# Patient Record
Sex: FEMALE | Race: ASIAN | NOT HISPANIC OR LATINO | Employment: OTHER | URBAN - METROPOLITAN AREA
[De-identification: names, ages, dates, MRNs, and addresses within clinical notes are randomized per-mention and may not be internally consistent; named-entity substitution may affect disease eponyms.]

---

## 2017-01-09 ENCOUNTER — PRENATAL OFFICE VISIT - HEALTHEAST (OUTPATIENT)
Dept: MIDWIFE SERVICES | Facility: CLINIC | Age: 22
End: 2017-01-09

## 2017-01-09 DIAGNOSIS — Z34.90 PREGNANCY: ICD-10-CM

## 2017-01-09 DIAGNOSIS — K59.00 CONSTIPATION: ICD-10-CM

## 2017-01-09 DIAGNOSIS — Z34.00 SUPERVISION OF NORMAL FIRST PREGNANCY: ICD-10-CM

## 2017-01-09 DIAGNOSIS — Z34.90 SUPERVISION OF NORMAL PREGNANCY: ICD-10-CM

## 2017-01-09 LAB — HIV 1+2 AB+HIV1 P24 AG SERPL QL IA: NEGATIVE

## 2017-01-09 ASSESSMENT — MIFFLIN-ST. JEOR: SCORE: 1279.06

## 2017-01-10 LAB
HBV SURFACE AG SERPL QL IA: NEGATIVE
SYPHILIS RPR SCREEN - HISTORICAL: NORMAL

## 2017-01-30 ENCOUNTER — PRENATAL OFFICE VISIT - HEALTHEAST (OUTPATIENT)
Dept: MIDWIFE SERVICES | Facility: CLINIC | Age: 22
End: 2017-01-30

## 2017-01-30 DIAGNOSIS — Z34.00 SUPERVISION OF NORMAL FIRST PREGNANCY: ICD-10-CM

## 2017-01-30 ASSESSMENT — MIFFLIN-ST. JEOR: SCORE: 1288.13

## 2017-01-31 LAB — SYPHILIS RPR SCREEN - HISTORICAL: NORMAL

## 2017-02-13 ENCOUNTER — PRENATAL OFFICE VISIT - HEALTHEAST (OUTPATIENT)
Dept: MIDWIFE SERVICES | Facility: CLINIC | Age: 22
End: 2017-02-13

## 2017-02-13 DIAGNOSIS — Z34.00 SUPERVISION OF NORMAL FIRST PREGNANCY: ICD-10-CM

## 2017-02-13 DIAGNOSIS — K59.00 CONSTIPATION: ICD-10-CM

## 2017-02-13 ASSESSMENT — MIFFLIN-ST. JEOR: SCORE: 1294.93

## 2017-02-27 ENCOUNTER — PRENATAL OFFICE VISIT - HEALTHEAST (OUTPATIENT)
Dept: MIDWIFE SERVICES | Facility: CLINIC | Age: 22
End: 2017-02-27

## 2017-02-27 DIAGNOSIS — K59.00 CONSTIPATION: ICD-10-CM

## 2017-02-27 DIAGNOSIS — Z34.90 PREGNANCY: ICD-10-CM

## 2017-02-27 ASSESSMENT — MIFFLIN-ST. JEOR: SCORE: 1305.14

## 2017-03-13 ENCOUNTER — PRENATAL OFFICE VISIT - HEALTHEAST (OUTPATIENT)
Dept: MIDWIFE SERVICES | Facility: CLINIC | Age: 22
End: 2017-03-13

## 2017-03-13 DIAGNOSIS — K59.00 CONSTIPATION: ICD-10-CM

## 2017-03-13 DIAGNOSIS — R12 HEARTBURN DURING PREGNANCY IN THIRD TRIMESTER: ICD-10-CM

## 2017-03-13 DIAGNOSIS — Z34.00 SUPERVISION OF NORMAL FIRST PREGNANCY: ICD-10-CM

## 2017-03-13 DIAGNOSIS — O26.893 HEARTBURN DURING PREGNANCY IN THIRD TRIMESTER: ICD-10-CM

## 2017-03-13 ASSESSMENT — MIFFLIN-ST. JEOR: SCORE: 1305.15

## 2017-03-27 ENCOUNTER — PRENATAL OFFICE VISIT - HEALTHEAST (OUTPATIENT)
Dept: MIDWIFE SERVICES | Facility: CLINIC | Age: 22
End: 2017-03-27

## 2017-03-27 DIAGNOSIS — Z34.00 SUPERVISION OF NORMAL FIRST PREGNANCY: ICD-10-CM

## 2017-03-27 ASSESSMENT — MIFFLIN-ST. JEOR: SCORE: 1317.62

## 2017-04-03 ENCOUNTER — PRENATAL OFFICE VISIT - HEALTHEAST (OUTPATIENT)
Dept: MIDWIFE SERVICES | Facility: CLINIC | Age: 22
End: 2017-04-03

## 2017-04-03 DIAGNOSIS — Z34.00 SUPERVISION OF NORMAL FIRST PREGNANCY: ICD-10-CM

## 2017-04-03 ASSESSMENT — MIFFLIN-ST. JEOR: SCORE: 1300.61

## 2017-04-10 ENCOUNTER — PRENATAL OFFICE VISIT - HEALTHEAST (OUTPATIENT)
Dept: MIDWIFE SERVICES | Facility: CLINIC | Age: 22
End: 2017-04-10

## 2017-04-10 DIAGNOSIS — Z34.00 SUPERVISION OF NORMAL FIRST PREGNANCY: ICD-10-CM

## 2017-04-10 DIAGNOSIS — K59.00 CONSTIPATION: ICD-10-CM

## 2017-04-10 ASSESSMENT — MIFFLIN-ST. JEOR: SCORE: 1315.35

## 2017-04-17 ENCOUNTER — PRENATAL OFFICE VISIT - HEALTHEAST (OUTPATIENT)
Dept: MIDWIFE SERVICES | Facility: CLINIC | Age: 22
End: 2017-04-17

## 2017-04-17 DIAGNOSIS — Z34.03 ENCOUNTER FOR SUPERVISION OF NORMAL FIRST PREGNANCY IN THIRD TRIMESTER: ICD-10-CM

## 2017-04-17 ASSESSMENT — MIFFLIN-ST. JEOR: SCORE: 1313.09

## 2017-04-24 ENCOUNTER — HOME CARE/HOSPICE - HEALTHEAST (OUTPATIENT)
Dept: HOME HEALTH SERVICES | Facility: HOME HEALTH | Age: 22
End: 2017-04-24

## 2017-04-26 ENCOUNTER — HOME CARE/HOSPICE - HEALTHEAST (OUTPATIENT)
Dept: HOME HEALTH SERVICES | Facility: HOME HEALTH | Age: 22
End: 2017-04-26

## 2017-04-27 ENCOUNTER — HOME CARE/HOSPICE - HEALTHEAST (OUTPATIENT)
Dept: HOME HEALTH SERVICES | Facility: HOME HEALTH | Age: 22
End: 2017-04-27

## 2017-06-05 ENCOUNTER — OFFICE VISIT - HEALTHEAST (OUTPATIENT)
Dept: MIDWIFE SERVICES | Facility: CLINIC | Age: 22
End: 2017-06-05

## 2017-06-05 DIAGNOSIS — Z00.00 HEALTH CARE MAINTENANCE: ICD-10-CM

## 2017-06-05 ASSESSMENT — MIFFLIN-ST. JEOR: SCORE: 1250.71

## 2017-07-03 ENCOUNTER — AMBULATORY - HEALTHEAST (OUTPATIENT)
Dept: FAMILY MEDICINE | Facility: CLINIC | Age: 22
End: 2017-07-03

## 2017-07-06 ENCOUNTER — OFFICE VISIT - HEALTHEAST (OUTPATIENT)
Dept: FAMILY MEDICINE | Facility: CLINIC | Age: 22
End: 2017-07-06

## 2017-07-06 DIAGNOSIS — Z23 IMMUNIZATION DUE: ICD-10-CM

## 2017-07-06 DIAGNOSIS — N39.0 UTI (URINARY TRACT INFECTION): ICD-10-CM

## 2017-07-07 ENCOUNTER — COMMUNICATION - HEALTHEAST (OUTPATIENT)
Dept: FAMILY MEDICINE | Facility: CLINIC | Age: 22
End: 2017-07-07

## 2017-07-25 ENCOUNTER — OFFICE VISIT - HEALTHEAST (OUTPATIENT)
Dept: FAMILY MEDICINE | Facility: CLINIC | Age: 22
End: 2017-07-25

## 2017-07-25 DIAGNOSIS — Z02.89 HISTORY AND PHYSICAL EXAMINATION, IMMIGRATION: ICD-10-CM

## 2017-07-25 ASSESSMENT — MIFFLIN-ST. JEOR: SCORE: 1163.96

## 2018-04-16 ENCOUNTER — OFFICE VISIT - HEALTHEAST (OUTPATIENT)
Dept: FAMILY MEDICINE | Facility: CLINIC | Age: 23
End: 2018-04-16

## 2018-04-16 DIAGNOSIS — O21.9 NAUSEA/VOMITING IN PREGNANCY: ICD-10-CM

## 2018-04-16 DIAGNOSIS — N91.2 AMENORRHEA: ICD-10-CM

## 2018-04-16 DIAGNOSIS — Z3A.09 9 WEEKS GESTATION OF PREGNANCY: ICD-10-CM

## 2018-04-16 LAB — HCG UR QL: POSITIVE

## 2018-04-16 ASSESSMENT — MIFFLIN-ST. JEOR: SCORE: 1145.82

## 2018-04-26 ENCOUNTER — AMBULATORY - HEALTHEAST (OUTPATIENT)
Dept: EMERGENCY MEDICINE | Facility: HOSPITAL | Age: 23
End: 2018-04-26

## 2018-05-03 ENCOUNTER — PRENATAL OFFICE VISIT - HEALTHEAST (OUTPATIENT)
Dept: FAMILY MEDICINE | Facility: CLINIC | Age: 23
End: 2018-05-03

## 2018-05-03 DIAGNOSIS — Z77.22 SECONDHAND SMOKE EXPOSURE: ICD-10-CM

## 2018-05-03 DIAGNOSIS — Z3A.11 11 WEEKS GESTATION OF PREGNANCY: ICD-10-CM

## 2018-05-03 LAB
ALBUMIN UR-MCNC: NEGATIVE MG/DL
APPEARANCE UR: ABNORMAL
BASOPHILS # BLD AUTO: 0 THOU/UL (ref 0–0.2)
BASOPHILS NFR BLD AUTO: 0 % (ref 0–2)
BILIRUB UR QL STRIP: NEGATIVE
COLOR UR AUTO: YELLOW
EOSINOPHIL # BLD AUTO: 0.1 THOU/UL (ref 0–0.4)
EOSINOPHIL NFR BLD AUTO: 1 % (ref 0–6)
ERYTHROCYTE [DISTWIDTH] IN BLOOD BY AUTOMATED COUNT: 12.9 % (ref 11–14.5)
GLUCOSE UR STRIP-MCNC: NEGATIVE MG/DL
HBV SURFACE AG SERPL QL IA: NEGATIVE
HCT VFR BLD AUTO: 39 % (ref 35–47)
HGB BLD-MCNC: 13.2 G/DL (ref 12–16)
HGB UR QL STRIP: NEGATIVE
HIV 1+2 AB+HIV1 P24 AG SERPL QL IA: NEGATIVE
KETONES UR STRIP-MCNC: NEGATIVE MG/DL
LEUKOCYTE ESTERASE UR QL STRIP: NEGATIVE
LYMPHOCYTES # BLD AUTO: 1 THOU/UL (ref 0.8–4.4)
LYMPHOCYTES NFR BLD AUTO: 15 % (ref 20–40)
MCH RBC QN AUTO: 28.6 PG (ref 27–34)
MCHC RBC AUTO-ENTMCNC: 33.8 G/DL (ref 32–36)
MCV RBC AUTO: 84 FL (ref 80–100)
MONOCYTES # BLD AUTO: 0.3 THOU/UL (ref 0–0.9)
MONOCYTES NFR BLD AUTO: 4 % (ref 2–10)
NEUTROPHILS # BLD AUTO: 5.5 THOU/UL (ref 2–7.7)
NEUTROPHILS NFR BLD AUTO: 80 % (ref 50–70)
NITRATE UR QL: NEGATIVE
PH UR STRIP: 6 [PH] (ref 5–8)
PLATELET # BLD AUTO: 298 THOU/UL (ref 140–440)
PMV BLD AUTO: 9.3 FL (ref 8.5–12.5)
RBC # BLD AUTO: 4.62 MILL/UL (ref 3.8–5.4)
SP GR UR STRIP: 1.02 (ref 1–1.03)
T PALLIDUM AB SER QL: NEGATIVE
UROBILINOGEN UR STRIP-ACNC: ABNORMAL
WBC: 7 THOU/UL (ref 4–11)

## 2018-05-03 ASSESSMENT — MIFFLIN-ST. JEOR: SCORE: 1137.88

## 2018-05-04 LAB
ANTIBODY SCREEN: NEGATIVE
BACTERIA SPEC CULT: NO GROWTH
C TRACH DNA SPEC QL PROBE+SIG AMP: NEGATIVE
N GONORRHOEA DNA SPEC QL NAA+PROBE: NEGATIVE
RUBV IGG SERPL QL IA: POSITIVE

## 2018-05-15 ENCOUNTER — RECORDS - HEALTHEAST (OUTPATIENT)
Dept: ADMINISTRATIVE | Facility: OTHER | Age: 23
End: 2018-05-15

## 2018-06-04 ENCOUNTER — COMMUNICATION - HEALTHEAST (OUTPATIENT)
Dept: FAMILY MEDICINE | Facility: CLINIC | Age: 23
End: 2018-06-04

## 2018-06-12 ENCOUNTER — COMMUNICATION - HEALTHEAST (OUTPATIENT)
Dept: FAMILY MEDICINE | Facility: CLINIC | Age: 23
End: 2018-06-12

## 2018-06-19 ENCOUNTER — PRENATAL OFFICE VISIT - HEALTHEAST (OUTPATIENT)
Dept: FAMILY MEDICINE | Facility: CLINIC | Age: 23
End: 2018-06-19

## 2018-06-19 DIAGNOSIS — Z34.82 ENCOUNTER FOR SUPERVISION OF OTHER NORMAL PREGNANCY IN SECOND TRIMESTER: ICD-10-CM

## 2018-06-19 LAB
AMPHETAMINES UR QL SCN: NORMAL
BARBITURATES UR QL: NORMAL
BENZODIAZ UR QL: NORMAL
CANNABINOIDS UR QL SCN: NORMAL
COCAINE UR QL: NORMAL
CREAT UR-MCNC: 36.7 MG/DL
METHADONE UR QL SCN: NORMAL
OPIATES UR QL SCN: NORMAL
OXYCODONE UR QL: NORMAL
PCP UR QL SCN: NORMAL

## 2018-06-19 ASSESSMENT — MIFFLIN-ST. JEOR: SCORE: 1158.29

## 2018-07-18 ENCOUNTER — PRENATAL OFFICE VISIT - HEALTHEAST (OUTPATIENT)
Dept: FAMILY MEDICINE | Facility: CLINIC | Age: 23
End: 2018-07-18

## 2018-07-18 DIAGNOSIS — Z34.02 SUPERVISION OF NORMAL FIRST PREGNANCY IN SECOND TRIMESTER: ICD-10-CM

## 2018-07-18 ASSESSMENT — MIFFLIN-ST. JEOR: SCORE: 1187.78

## 2018-07-19 ENCOUNTER — RECORDS - HEALTHEAST (OUTPATIENT)
Dept: ADMINISTRATIVE | Facility: OTHER | Age: 23
End: 2018-07-19

## 2018-08-29 ENCOUNTER — RECORDS - HEALTHEAST (OUTPATIENT)
Dept: ADMINISTRATIVE | Facility: OTHER | Age: 23
End: 2018-08-29

## 2018-08-29 ENCOUNTER — PRENATAL OFFICE VISIT - HEALTHEAST (OUTPATIENT)
Dept: FAMILY MEDICINE | Facility: CLINIC | Age: 23
End: 2018-08-29

## 2018-08-29 DIAGNOSIS — Z23 NEED FOR INFLUENZA VACCINATION: ICD-10-CM

## 2018-08-29 DIAGNOSIS — Z34.82 ENCOUNTER FOR SUPERVISION OF OTHER NORMAL PREGNANCY IN SECOND TRIMESTER: ICD-10-CM

## 2018-08-29 DIAGNOSIS — Z34.90 PREGNANCY: ICD-10-CM

## 2018-08-29 LAB
FASTING STATUS PATIENT QL REPORTED: NO
GLUCOSE 1H P 50 G GLC PO SERPL-MCNC: 76 MG/DL (ref 70–139)
HGB BLD-MCNC: 13 G/DL (ref 12–16)

## 2018-08-29 ASSESSMENT — MIFFLIN-ST. JEOR: SCORE: 1227.47

## 2018-08-30 LAB — T PALLIDUM AB SER QL: NEGATIVE

## 2018-09-02 ENCOUNTER — COMMUNICATION - HEALTHEAST (OUTPATIENT)
Dept: FAMILY MEDICINE | Facility: CLINIC | Age: 23
End: 2018-09-02

## 2018-09-28 ENCOUNTER — PRENATAL OFFICE VISIT - HEALTHEAST (OUTPATIENT)
Dept: FAMILY MEDICINE | Facility: CLINIC | Age: 23
End: 2018-09-28

## 2018-09-28 DIAGNOSIS — Z30.09 GENERAL COUNSELING AND ADVICE FOR CONTRACEPTIVE MANAGEMENT: ICD-10-CM

## 2018-09-28 DIAGNOSIS — Z3A.31 31 WEEKS GESTATION OF PREGNANCY: ICD-10-CM

## 2018-09-28 ASSESSMENT — MIFFLIN-ST. JEOR: SCORE: 1252.41

## 2018-10-19 ENCOUNTER — PRENATAL OFFICE VISIT - HEALTHEAST (OUTPATIENT)
Dept: FAMILY MEDICINE | Facility: CLINIC | Age: 23
End: 2018-10-19

## 2018-10-19 DIAGNOSIS — Z3A.34 34 WEEKS GESTATION OF PREGNANCY: ICD-10-CM

## 2018-10-19 ASSESSMENT — MIFFLIN-ST. JEOR: SCORE: 1261.49

## 2018-11-08 ENCOUNTER — PRENATAL OFFICE VISIT - HEALTHEAST (OUTPATIENT)
Dept: FAMILY MEDICINE | Facility: CLINIC | Age: 23
End: 2018-11-08

## 2018-11-08 DIAGNOSIS — Z3A.37 37 WEEKS GESTATION OF PREGNANCY: ICD-10-CM

## 2018-11-08 ASSESSMENT — MIFFLIN-ST. JEOR: SCORE: 1279.63

## 2018-11-09 LAB
ALLERGIC TO PENICILLIN: NO
GP B STREP DNA SPEC QL NAA+PROBE: NEGATIVE

## 2018-11-16 ENCOUNTER — PRENATAL OFFICE VISIT - HEALTHEAST (OUTPATIENT)
Dept: FAMILY MEDICINE | Facility: CLINIC | Age: 23
End: 2018-11-16

## 2018-11-16 DIAGNOSIS — Z3A.38 38 WEEKS GESTATION OF PREGNANCY: ICD-10-CM

## 2018-11-16 ASSESSMENT — MIFFLIN-ST. JEOR: SCORE: 1273.94

## 2018-11-23 ENCOUNTER — HOME CARE/HOSPICE - HEALTHEAST (OUTPATIENT)
Dept: HOME HEALTH SERVICES | Facility: HOME HEALTH | Age: 23
End: 2018-11-23

## 2018-11-25 ENCOUNTER — HOME CARE/HOSPICE - HEALTHEAST (OUTPATIENT)
Dept: HOME HEALTH SERVICES | Facility: HOME HEALTH | Age: 23
End: 2018-11-25

## 2018-11-28 ENCOUNTER — AMBULATORY - HEALTHEAST (OUTPATIENT)
Dept: FAMILY MEDICINE | Facility: CLINIC | Age: 23
End: 2018-11-28

## 2019-01-10 ENCOUNTER — OFFICE VISIT - HEALTHEAST (OUTPATIENT)
Dept: FAMILY MEDICINE | Facility: CLINIC | Age: 24
End: 2019-01-10

## 2019-01-10 DIAGNOSIS — G89.29 CHRONIC BILATERAL LOW BACK PAIN WITHOUT SCIATICA: ICD-10-CM

## 2019-01-10 DIAGNOSIS — Z30.42 DEPO-PROVERA CONTRACEPTIVE STATUS: ICD-10-CM

## 2019-01-10 DIAGNOSIS — M54.50 CHRONIC BILATERAL LOW BACK PAIN WITHOUT SCIATICA: ICD-10-CM

## 2019-01-10 DIAGNOSIS — R21 RASH AND NONSPECIFIC SKIN ERUPTION: ICD-10-CM

## 2019-01-10 LAB
HCG UR QL: NEGATIVE
SP GR UR STRIP: 1.02 (ref 1–1.03)

## 2019-01-10 RX ORDER — DIAPER,BRIEF,INFANT-TODD,DISP
EACH MISCELLANEOUS
Qty: 30 G | Refills: 1 | Status: SHIPPED | OUTPATIENT
Start: 2019-01-10 | End: 2023-05-15

## 2019-01-10 ASSESSMENT — MIFFLIN-ST. JEOR: SCORE: 1184.94

## 2019-04-01 ENCOUNTER — AMBULATORY - HEALTHEAST (OUTPATIENT)
Dept: FAMILY MEDICINE | Facility: CLINIC | Age: 24
End: 2019-04-01

## 2019-04-01 DIAGNOSIS — Z30.013 ENCOUNTER FOR INITIAL PRESCRIPTION OF INJECTABLE CONTRACEPTIVE: ICD-10-CM

## 2019-04-03 ENCOUNTER — AMBULATORY - HEALTHEAST (OUTPATIENT)
Dept: NURSING | Facility: CLINIC | Age: 24
End: 2019-04-03

## 2019-06-04 ENCOUNTER — OFFICE VISIT - HEALTHEAST (OUTPATIENT)
Dept: FAMILY MEDICINE | Facility: CLINIC | Age: 24
End: 2019-06-04

## 2019-06-04 DIAGNOSIS — M79.602 PAIN IN BOTH UPPER EXTREMITIES: ICD-10-CM

## 2019-06-04 DIAGNOSIS — Z34.90 PREGNANCY: ICD-10-CM

## 2019-06-04 DIAGNOSIS — Z30.42 DEPO-PROVERA CONTRACEPTIVE STATUS: ICD-10-CM

## 2019-06-04 DIAGNOSIS — M25.649 MORNING JOINT STIFFNESS OF HAND, UNSPECIFIED LATERALITY: ICD-10-CM

## 2019-06-04 DIAGNOSIS — M79.601 PAIN IN BOTH UPPER EXTREMITIES: ICD-10-CM

## 2019-06-04 LAB
ALBUMIN SERPL-MCNC: 4.3 G/DL (ref 3.5–5)
ALP SERPL-CCNC: 60 U/L (ref 45–120)
ALT SERPL W P-5'-P-CCNC: 16 U/L (ref 0–45)
ANION GAP SERPL CALCULATED.3IONS-SCNC: 9 MMOL/L (ref 5–18)
AST SERPL W P-5'-P-CCNC: 13 U/L (ref 0–40)
BASOPHILS # BLD AUTO: 0 THOU/UL (ref 0–0.2)
BASOPHILS NFR BLD AUTO: 0 % (ref 0–2)
BILIRUB SERPL-MCNC: 0.5 MG/DL (ref 0–1)
BUN SERPL-MCNC: 14 MG/DL (ref 8–22)
CALCIUM SERPL-MCNC: 9.9 MG/DL (ref 8.5–10.5)
CHLORIDE BLD-SCNC: 107 MMOL/L (ref 98–107)
CO2 SERPL-SCNC: 23 MMOL/L (ref 22–31)
CREAT SERPL-MCNC: 0.64 MG/DL (ref 0.6–1.1)
EOSINOPHIL # BLD AUTO: 0.1 THOU/UL (ref 0–0.4)
EOSINOPHIL NFR BLD AUTO: 1 % (ref 0–6)
ERYTHROCYTE [DISTWIDTH] IN BLOOD BY AUTOMATED COUNT: 11.9 % (ref 11–14.5)
ERYTHROCYTE [SEDIMENTATION RATE] IN BLOOD BY WESTERGREN METHOD: 6 MM/HR (ref 0–20)
GFR SERPL CREATININE-BSD FRML MDRD: >60 ML/MIN/1.73M2
GLUCOSE BLD-MCNC: 83 MG/DL (ref 70–125)
HCT VFR BLD AUTO: 40.5 % (ref 35–47)
HGB BLD-MCNC: 13.6 G/DL (ref 12–16)
LYMPHOCYTES # BLD AUTO: 2 THOU/UL (ref 0.8–4.4)
LYMPHOCYTES NFR BLD AUTO: 37 % (ref 20–40)
MCH RBC QN AUTO: 28.9 PG (ref 27–34)
MCHC RBC AUTO-ENTMCNC: 33.6 G/DL (ref 32–36)
MCV RBC AUTO: 86 FL (ref 80–100)
MONOCYTES # BLD AUTO: 0.3 THOU/UL (ref 0–0.9)
MONOCYTES NFR BLD AUTO: 6 % (ref 2–10)
NEUTROPHILS # BLD AUTO: 3.1 THOU/UL (ref 2–7.7)
NEUTROPHILS NFR BLD AUTO: 56 % (ref 50–70)
PLATELET # BLD AUTO: 273 THOU/UL (ref 140–440)
PMV BLD AUTO: 6.5 FL (ref 7–10)
POTASSIUM BLD-SCNC: 4 MMOL/L (ref 3.5–5)
PROT SERPL-MCNC: 7.7 G/DL (ref 6–8)
RBC # BLD AUTO: 4.71 MILL/UL (ref 3.8–5.4)
RHEUMATOID FACT SERPL-ACNC: <15 IU/ML (ref 0–30)
SODIUM SERPL-SCNC: 139 MMOL/L (ref 136–145)
TSH SERPL DL<=0.005 MIU/L-ACNC: 0.69 UIU/ML (ref 0.3–5)
WBC: 5.5 THOU/UL (ref 4–11)

## 2019-06-04 ASSESSMENT — MIFFLIN-ST. JEOR: SCORE: 1182.67

## 2019-06-05 LAB — 25(OH)D3 SERPL-MCNC: 19 NG/ML (ref 30–80)

## 2019-06-06 ENCOUNTER — AMBULATORY - HEALTHEAST (OUTPATIENT)
Dept: FAMILY MEDICINE | Facility: CLINIC | Age: 24
End: 2019-06-06

## 2019-06-06 DIAGNOSIS — E55.9 VITAMIN D DEFICIENCY: ICD-10-CM

## 2019-06-06 LAB — ANA SER QL: 0.8 U

## 2019-06-19 ENCOUNTER — AMBULATORY - HEALTHEAST (OUTPATIENT)
Dept: NURSING | Facility: CLINIC | Age: 24
End: 2019-06-19

## 2019-09-04 ENCOUNTER — AMBULATORY - HEALTHEAST (OUTPATIENT)
Dept: NURSING | Facility: CLINIC | Age: 24
End: 2019-09-04

## 2019-09-24 ENCOUNTER — RECORDS - HEALTHEAST (OUTPATIENT)
Dept: ADMINISTRATIVE | Facility: OTHER | Age: 24
End: 2019-09-24

## 2019-11-12 ENCOUNTER — COMMUNICATION - HEALTHEAST (OUTPATIENT)
Dept: FAMILY MEDICINE | Facility: CLINIC | Age: 24
End: 2019-11-12

## 2019-11-12 ENCOUNTER — AMBULATORY - HEALTHEAST (OUTPATIENT)
Dept: FAMILY MEDICINE | Facility: CLINIC | Age: 24
End: 2019-11-12

## 2019-11-12 DIAGNOSIS — Z30.42 DEPO-PROVERA CONTRACEPTIVE STATUS: ICD-10-CM

## 2020-09-08 ENCOUNTER — COMMUNICATION - HEALTHEAST (OUTPATIENT)
Dept: FAMILY MEDICINE | Facility: CLINIC | Age: 25
End: 2020-09-08

## 2020-09-08 ENCOUNTER — OFFICE VISIT - HEALTHEAST (OUTPATIENT)
Dept: FAMILY MEDICINE | Facility: CLINIC | Age: 25
End: 2020-09-08

## 2020-09-08 DIAGNOSIS — Z23 NEED FOR IMMUNIZATION AGAINST INFLUENZA: ICD-10-CM

## 2020-09-08 DIAGNOSIS — Z3A.12 12 WEEKS GESTATION OF PREGNANCY: ICD-10-CM

## 2020-09-08 DIAGNOSIS — N91.2 AMENORRHEA: ICD-10-CM

## 2020-09-08 DIAGNOSIS — Z32.01 PREGNANCY EXAMINATION OR TEST, POSITIVE RESULT: ICD-10-CM

## 2020-09-08 LAB — HCG UR QL: POSITIVE

## 2020-09-08 ASSESSMENT — MIFFLIN-ST. JEOR: SCORE: 1183.03

## 2020-09-22 ENCOUNTER — RECORDS - HEALTHEAST (OUTPATIENT)
Dept: ADMINISTRATIVE | Facility: OTHER | Age: 25
End: 2020-09-22

## 2020-10-09 ENCOUNTER — PRENATAL OFFICE VISIT - HEALTHEAST (OUTPATIENT)
Dept: FAMILY MEDICINE | Facility: CLINIC | Age: 25
End: 2020-10-09

## 2020-10-09 DIAGNOSIS — R29.898 TIGHTNESS OF NECK: ICD-10-CM

## 2020-10-09 DIAGNOSIS — Z34.81 ENCOUNTER FOR SUPERVISION OF OTHER NORMAL PREGNANCY IN FIRST TRIMESTER: ICD-10-CM

## 2020-10-09 DIAGNOSIS — S13.9XXA NECK SPRAIN, INITIAL ENCOUNTER: ICD-10-CM

## 2020-10-09 DIAGNOSIS — Z12.4 CERVICAL CANCER SCREENING: ICD-10-CM

## 2020-10-09 LAB
ALBUMIN UR-MCNC: NEGATIVE MG/DL
AMPHETAMINES UR QL SCN: NORMAL
APPEARANCE UR: ABNORMAL
BARBITURATES UR QL: NORMAL
BASOPHILS # BLD AUTO: 0 THOU/UL (ref 0–0.2)
BASOPHILS NFR BLD AUTO: 0 % (ref 0–2)
BENZODIAZ UR QL: NORMAL
BILIRUB UR QL STRIP: NEGATIVE
CANNABINOIDS UR QL SCN: NORMAL
COCAINE UR QL: NORMAL
COLOR UR AUTO: YELLOW
CREAT UR-MCNC: 92 MG/DL
EOSINOPHIL # BLD AUTO: 0.4 THOU/UL (ref 0–0.4)
EOSINOPHIL NFR BLD AUTO: 4 % (ref 0–6)
ERYTHROCYTE [DISTWIDTH] IN BLOOD BY AUTOMATED COUNT: 13.2 % (ref 11–14.5)
GLUCOSE UR STRIP-MCNC: NEGATIVE MG/DL
HCT VFR BLD AUTO: 33.7 % (ref 35–47)
HGB BLD-MCNC: 11.8 G/DL (ref 12–16)
HGB UR QL STRIP: ABNORMAL
HIV 1+2 AB+HIV1 P24 AG SERPL QL IA: NEGATIVE
IMM GRANULOCYTES # BLD: 0 THOU/UL
IMM GRANULOCYTES NFR BLD: 0 %
KETONES UR STRIP-MCNC: NEGATIVE MG/DL
LEUKOCYTE ESTERASE UR QL STRIP: NEGATIVE
LYMPHOCYTES # BLD AUTO: 2 THOU/UL (ref 0.8–4.4)
LYMPHOCYTES NFR BLD AUTO: 23 % (ref 20–40)
MCH RBC QN AUTO: 29.2 PG (ref 27–34)
MCHC RBC AUTO-ENTMCNC: 35 G/DL (ref 32–36)
MCV RBC AUTO: 83 FL (ref 80–100)
MONOCYTES # BLD AUTO: 0.4 THOU/UL (ref 0–0.9)
MONOCYTES NFR BLD AUTO: 4 % (ref 2–10)
NEUTROPHILS # BLD AUTO: 5.8 THOU/UL (ref 2–7.7)
NEUTROPHILS NFR BLD AUTO: 68 % (ref 50–70)
NITRATE UR QL: NEGATIVE
OPIATES UR QL SCN: NORMAL
OXYCODONE UR QL: NORMAL
PCP UR QL SCN: NORMAL
PH UR STRIP: 7 [PH] (ref 5–8)
PLATELET # BLD AUTO: 281 THOU/UL (ref 140–440)
PMV BLD AUTO: 9 FL (ref 8.5–12.5)
RBC # BLD AUTO: 4.04 MILL/UL (ref 3.8–5.4)
SP GR UR STRIP: 1.02 (ref 1–1.03)
UROBILINOGEN UR STRIP-ACNC: ABNORMAL
WBC: 8.6 THOU/UL (ref 4–11)

## 2020-10-09 RX ORDER — ACETAMINOPHEN 500 MG
1000 TABLET ORAL EVERY 6 HOURS PRN
Qty: 200 TABLET | Refills: 2 | Status: SHIPPED | OUTPATIENT
Start: 2020-10-09 | End: 2021-10-06

## 2020-10-09 ASSESSMENT — MIFFLIN-ST. JEOR: SCORE: 1186.07

## 2020-10-10 LAB
ANTIBODY SCREEN: NEGATIVE
BACTERIA SPEC CULT: NO GROWTH
HBV SURFACE AG SERPL QL IA: NEGATIVE
T PALLIDUM AB SER QL: NEGATIVE

## 2020-10-12 LAB
BKR LAB AP ABNORMAL BLEEDING: NO
BKR LAB AP BIRTH CONTROL/HORMONES: NORMAL
BKR LAB AP CERVICAL APPEARANCE: NORMAL
BKR LAB AP GYN ADEQUACY: NORMAL
BKR LAB AP GYN INTERPRETATION: NORMAL
BKR LAB AP HPV REFLEX: NORMAL
BKR LAB AP LMP: NORMAL
BKR LAB AP PATIENT STATUS: NORMAL
BKR LAB AP PREVIOUS ABNORMAL: NORMAL
BKR LAB AP PREVIOUS NORMAL: NORMAL
HIGH RISK?: NO
PATH REPORT.COMMENTS IMP SPEC: NORMAL
RESULT FLAG (HE HISTORICAL CONVERSION): NORMAL
RUBV IGG SERPL QL IA: NEGATIVE

## 2020-10-13 LAB
C TRACH DNA SPEC QL PROBE+SIG AMP: NEGATIVE
N GONORRHOEA DNA SPEC QL NAA+PROBE: NEGATIVE

## 2020-10-14 LAB
COLLECTION METHOD: NORMAL
LEAD BLD-MCNC: NORMAL UG/DL
LEAD BLDV-MCNC: <2 UG/DL

## 2020-10-15 ENCOUNTER — COMMUNICATION - HEALTHEAST (OUTPATIENT)
Dept: FAMILY MEDICINE | Facility: CLINIC | Age: 25
End: 2020-10-15

## 2020-11-09 ENCOUNTER — OFFICE VISIT - HEALTHEAST (OUTPATIENT)
Dept: FAMILY MEDICINE | Facility: CLINIC | Age: 25
End: 2020-11-09

## 2020-11-09 DIAGNOSIS — G89.29 CHRONIC BILATERAL LOW BACK PAIN WITHOUT SCIATICA: ICD-10-CM

## 2020-11-09 DIAGNOSIS — Z34.82 ENCOUNTER FOR SUPERVISION OF OTHER NORMAL PREGNANCY IN SECOND TRIMESTER: ICD-10-CM

## 2020-11-09 DIAGNOSIS — K59.00 CONSTIPATION, UNSPECIFIED CONSTIPATION TYPE: ICD-10-CM

## 2020-11-09 DIAGNOSIS — M54.50 CHRONIC BILATERAL LOW BACK PAIN WITHOUT SCIATICA: ICD-10-CM

## 2020-12-14 ENCOUNTER — PRENATAL OFFICE VISIT - HEALTHEAST (OUTPATIENT)
Dept: FAMILY MEDICINE | Facility: CLINIC | Age: 25
End: 2020-12-14

## 2020-12-14 ENCOUNTER — RECORDS - HEALTHEAST (OUTPATIENT)
Dept: ADMINISTRATIVE | Facility: OTHER | Age: 25
End: 2020-12-14

## 2020-12-14 DIAGNOSIS — Z34.82 ENCOUNTER FOR SUPERVISION OF OTHER NORMAL PREGNANCY IN SECOND TRIMESTER: ICD-10-CM

## 2020-12-14 ASSESSMENT — MIFFLIN-ST. JEOR: SCORE: 1232.57

## 2021-01-20 ENCOUNTER — RECORDS - HEALTHEAST (OUTPATIENT)
Dept: ADMINISTRATIVE | Facility: OTHER | Age: 26
End: 2021-01-20

## 2021-01-22 ENCOUNTER — PRENATAL OFFICE VISIT - HEALTHEAST (OUTPATIENT)
Dept: FAMILY MEDICINE | Facility: CLINIC | Age: 26
End: 2021-01-22

## 2021-01-22 DIAGNOSIS — Z34.82 ENCOUNTER FOR SUPERVISION OF OTHER NORMAL PREGNANCY IN SECOND TRIMESTER: ICD-10-CM

## 2021-01-22 LAB
FASTING STATUS PATIENT QL REPORTED: NO
GLUCOSE 1H P 50 G GLC PO SERPL-MCNC: 60 MG/DL (ref 70–139)
HGB BLD-MCNC: 11.8 G/DL (ref 12–16)

## 2021-01-22 ASSESSMENT — MIFFLIN-ST. JEOR: SCORE: 1273.39

## 2021-01-23 LAB — T PALLIDUM AB SER QL: NEGATIVE

## 2021-02-19 ENCOUNTER — PRENATAL OFFICE VISIT - HEALTHEAST (OUTPATIENT)
Dept: FAMILY MEDICINE | Facility: CLINIC | Age: 26
End: 2021-02-19

## 2021-02-19 DIAGNOSIS — K59.00 CONSTIPATION, UNSPECIFIED CONSTIPATION TYPE: ICD-10-CM

## 2021-02-19 DIAGNOSIS — Z34.83 ENCOUNTER FOR SUPERVISION OF OTHER NORMAL PREGNANCY IN THIRD TRIMESTER: ICD-10-CM

## 2021-02-19 DIAGNOSIS — O26.813 PREGNANCY RELATED FATIGUE IN THIRD TRIMESTER: ICD-10-CM

## 2021-02-19 LAB
HGB BLD-MCNC: 12 G/DL (ref 12–16)
TSH SERPL DL<=0.005 MIU/L-ACNC: 1.99 UIU/ML (ref 0.3–5)

## 2021-02-19 ASSESSMENT — MIFFLIN-ST. JEOR: SCORE: 1264.32

## 2021-02-22 ENCOUNTER — RECORDS - HEALTHEAST (OUTPATIENT)
Dept: ADMINISTRATIVE | Facility: OTHER | Age: 26
End: 2021-02-22

## 2021-02-22 ENCOUNTER — COMMUNICATION - HEALTHEAST (OUTPATIENT)
Dept: FAMILY MEDICINE | Facility: CLINIC | Age: 26
End: 2021-02-22

## 2021-02-22 DIAGNOSIS — R79.89 LOW VITAMIN D LEVEL: ICD-10-CM

## 2021-02-22 LAB — 25(OH)D3 SERPL-MCNC: 15.9 NG/ML (ref 30–80)

## 2021-03-05 ENCOUNTER — PRENATAL OFFICE VISIT - HEALTHEAST (OUTPATIENT)
Dept: FAMILY MEDICINE | Facility: CLINIC | Age: 26
End: 2021-03-05

## 2021-03-05 ENCOUNTER — RECORDS - HEALTHEAST (OUTPATIENT)
Dept: ADMINISTRATIVE | Facility: OTHER | Age: 26
End: 2021-03-05

## 2021-03-05 DIAGNOSIS — O36.5990 ASYMMETRIC IUGR AFFECTING PREGNANCY, ANTEPARTUM: ICD-10-CM

## 2021-03-05 DIAGNOSIS — F51.01 PRIMARY INSOMNIA: ICD-10-CM

## 2021-03-05 DIAGNOSIS — Z34.83 ENCOUNTER FOR SUPERVISION OF OTHER NORMAL PREGNANCY IN THIRD TRIMESTER: ICD-10-CM

## 2021-03-05 ASSESSMENT — MIFFLIN-ST. JEOR: SCORE: 1276.79

## 2021-03-06 LAB
ALLERGIC TO PENICILLIN: NO
GP B STREP DNA SPEC QL NAA+PROBE: NEGATIVE

## 2021-03-10 ENCOUNTER — COMMUNICATION - HEALTHEAST (OUTPATIENT)
Dept: FAMILY MEDICINE | Facility: CLINIC | Age: 26
End: 2021-03-10

## 2021-03-12 ENCOUNTER — PRENATAL OFFICE VISIT - HEALTHEAST (OUTPATIENT)
Dept: FAMILY MEDICINE | Facility: CLINIC | Age: 26
End: 2021-03-12

## 2021-03-12 DIAGNOSIS — Z34.83 ENCOUNTER FOR SUPERVISION OF OTHER NORMAL PREGNANCY IN THIRD TRIMESTER: ICD-10-CM

## 2021-03-12 ASSESSMENT — MIFFLIN-ST. JEOR: SCORE: 1273.39

## 2021-03-15 ENCOUNTER — RECORDS - HEALTHEAST (OUTPATIENT)
Dept: ADMINISTRATIVE | Facility: OTHER | Age: 26
End: 2021-03-15

## 2021-03-15 ENCOUNTER — PRENATAL OFFICE VISIT - HEALTHEAST (OUTPATIENT)
Dept: FAMILY MEDICINE | Facility: CLINIC | Age: 26
End: 2021-03-15

## 2021-03-15 ENCOUNTER — COMMUNICATION - HEALTHEAST (OUTPATIENT)
Dept: FAMILY MEDICINE | Facility: CLINIC | Age: 26
End: 2021-03-15

## 2021-03-15 DIAGNOSIS — O36.5930 POOR FETAL GROWTH AFFECTING MANAGEMENT OF MOTHER IN THIRD TRIMESTER, SINGLE OR UNSPECIFIED FETUS: ICD-10-CM

## 2021-03-15 ASSESSMENT — MIFFLIN-ST. JEOR: SCORE: 1275.66

## 2021-03-16 LAB
SARS-COV-2 PCR COMMENT: NORMAL
SARS-COV-2 RNA SPEC QL NAA+PROBE: NEGATIVE
SARS-COV-2 VIRUS SPECIMEN SOURCE: NORMAL

## 2021-03-17 ENCOUNTER — AMBULATORY - HEALTHEAST (OUTPATIENT)
Dept: MATERNAL FETAL MEDICINE | Facility: HOSPITAL | Age: 26
End: 2021-03-17

## 2021-03-17 ENCOUNTER — COMMUNICATION - HEALTHEAST (OUTPATIENT)
Dept: SCHEDULING | Facility: CLINIC | Age: 26
End: 2021-03-17

## 2021-03-17 ENCOUNTER — COMMUNICATION - HEALTHEAST (OUTPATIENT)
Dept: FAMILY MEDICINE | Facility: CLINIC | Age: 26
End: 2021-03-17

## 2021-03-17 DIAGNOSIS — O36.5930 POOR FETAL GROWTH AFFECTING MANAGEMENT OF MOTHER IN THIRD TRIMESTER, SINGLE OR UNSPECIFIED FETUS: ICD-10-CM

## 2021-03-17 DIAGNOSIS — O36.5990 POOR FETAL GROWTH AFFECTING MANAGEMENT OF MOTHER, ANTEPARTUM, SINGLE OR UNSPECIFIED FETUS: ICD-10-CM

## 2021-03-18 ENCOUNTER — RECORDS - HEALTHEAST (OUTPATIENT)
Dept: ULTRASOUND IMAGING | Facility: HOSPITAL | Age: 26
End: 2021-03-18

## 2021-03-18 ENCOUNTER — OFFICE VISIT - HEALTHEAST (OUTPATIENT)
Dept: MATERNAL FETAL MEDICINE | Facility: HOSPITAL | Age: 26
End: 2021-03-18

## 2021-03-18 ENCOUNTER — RECORDS - HEALTHEAST (OUTPATIENT)
Dept: ADMINISTRATIVE | Facility: OTHER | Age: 26
End: 2021-03-18

## 2021-03-18 DIAGNOSIS — O36.5930 POOR FETAL GROWTH AFFECTING MANAGEMENT OF MOTHER IN THIRD TRIMESTER, SINGLE OR UNSPECIFIED FETUS: ICD-10-CM

## 2021-03-18 DIAGNOSIS — O36.5990 MATERNAL CARE FOR OTHER KNOWN OR SUSPECTED POOR FETAL GROWTH, UNSPECIFIED TRIMESTER, NOT APPLICABLE OR UNSPECIFIED: ICD-10-CM

## 2021-03-19 ENCOUNTER — PRENATAL OFFICE VISIT - HEALTHEAST (OUTPATIENT)
Dept: FAMILY MEDICINE | Facility: CLINIC | Age: 26
End: 2021-03-19

## 2021-03-19 DIAGNOSIS — O36.5930 POOR FETAL GROWTH AFFECTING MANAGEMENT OF MOTHER IN THIRD TRIMESTER, SINGLE OR UNSPECIFIED FETUS: ICD-10-CM

## 2021-03-19 DIAGNOSIS — Z34.83 ENCOUNTER FOR SUPERVISION OF OTHER NORMAL PREGNANCY IN THIRD TRIMESTER: ICD-10-CM

## 2021-03-19 ASSESSMENT — MIFFLIN-ST. JEOR: SCORE: 1272.26

## 2021-03-24 ENCOUNTER — PRENATAL OFFICE VISIT - HEALTHEAST (OUTPATIENT)
Dept: FAMILY MEDICINE | Facility: CLINIC | Age: 26
End: 2021-03-24

## 2021-03-24 DIAGNOSIS — Z34.83 ENCOUNTER FOR SUPERVISION OF OTHER NORMAL PREGNANCY IN THIRD TRIMESTER: ICD-10-CM

## 2021-03-24 DIAGNOSIS — O36.5930 POOR FETAL GROWTH AFFECTING MANAGEMENT OF MOTHER IN THIRD TRIMESTER, SINGLE OR UNSPECIFIED FETUS: ICD-10-CM

## 2021-03-24 ASSESSMENT — MIFFLIN-ST. JEOR: SCORE: 1266.59

## 2021-03-26 ENCOUNTER — OFFICE VISIT - HEALTHEAST (OUTPATIENT)
Dept: MATERNAL FETAL MEDICINE | Facility: HOSPITAL | Age: 26
End: 2021-03-26

## 2021-03-26 ENCOUNTER — RECORDS - HEALTHEAST (OUTPATIENT)
Dept: ADMINISTRATIVE | Facility: OTHER | Age: 26
End: 2021-03-26

## 2021-03-26 ENCOUNTER — RECORDS - HEALTHEAST (OUTPATIENT)
Dept: ULTRASOUND IMAGING | Facility: HOSPITAL | Age: 26
End: 2021-03-26

## 2021-03-26 DIAGNOSIS — O36.5930 POOR FETAL GROWTH AFFECTING MANAGEMENT OF MOTHER IN THIRD TRIMESTER, SINGLE OR UNSPECIFIED FETUS: ICD-10-CM

## 2021-03-26 DIAGNOSIS — O36.5930 MATERNAL CARE FOR OTHER KNOWN OR SUSPECTED POOR FETAL GROWTH, THIRD TRIMESTER, NOT APPLICABLE OR UNSPECIFIED: ICD-10-CM

## 2021-03-31 ENCOUNTER — PRENATAL OFFICE VISIT - HEALTHEAST (OUTPATIENT)
Dept: FAMILY MEDICINE | Facility: CLINIC | Age: 26
End: 2021-03-31

## 2021-03-31 DIAGNOSIS — O09.893 SUPERVISION OF OTHER HIGH RISK PREGNANCIES, THIRD TRIMESTER: ICD-10-CM

## 2021-03-31 DIAGNOSIS — O36.5930 POOR FETAL GROWTH AFFECTING MANAGEMENT OF MOTHER IN THIRD TRIMESTER, SINGLE OR UNSPECIFIED FETUS: ICD-10-CM

## 2021-03-31 ASSESSMENT — MIFFLIN-ST. JEOR: SCORE: 1277.93

## 2021-04-01 ENCOUNTER — COMMUNICATION - HEALTHEAST (OUTPATIENT)
Dept: SCHEDULING | Facility: CLINIC | Age: 26
End: 2021-04-01

## 2021-05-12 ASSESSMENT — EDINBURGH POSTNATAL DEPRESSION SCALE (EPDS): TOTAL SCORE: 0

## 2021-05-14 ENCOUNTER — OFFICE VISIT - HEALTHEAST (OUTPATIENT)
Dept: FAMILY MEDICINE | Facility: CLINIC | Age: 26
End: 2021-05-14

## 2021-05-14 DIAGNOSIS — R79.89 LOW VITAMIN D LEVEL: ICD-10-CM

## 2021-05-14 DIAGNOSIS — K59.00 CONSTIPATION, UNSPECIFIED CONSTIPATION TYPE: ICD-10-CM

## 2021-05-14 DIAGNOSIS — Z30.42 DEPO-PROVERA CONTRACEPTIVE STATUS: ICD-10-CM

## 2021-05-14 DIAGNOSIS — Z78.9 USES BIRTH CONTROL: ICD-10-CM

## 2021-05-14 LAB — HCG UR QL: NEGATIVE

## 2021-05-14 RX ORDER — DOCUSATE SODIUM 100 MG/1
100 CAPSULE, LIQUID FILLED ORAL 2 TIMES DAILY PRN
Qty: 120 CAPSULE | Refills: 3 | Status: SHIPPED | OUTPATIENT
Start: 2021-05-14 | End: 2021-08-03

## 2021-05-14 ASSESSMENT — MIFFLIN-ST. JEOR: SCORE: 1197.48

## 2021-05-27 VITALS
BODY MASS INDEX: 23.12 KG/M2 | TEMPERATURE: 98.6 F | HEART RATE: 82 BPM | OXYGEN SATURATION: 98 % | HEIGHT: 60 IN | SYSTOLIC BLOOD PRESSURE: 96 MMHG | WEIGHT: 117.75 LBS | DIASTOLIC BLOOD PRESSURE: 68 MMHG

## 2021-05-29 NOTE — PROGRESS NOTES
HPI - 22 yo female here with stiffness in hands and pain in the mornings.     Pain in fingers and wrist in both hand started 1-2 months ago.   Pain and stiffness improves during the day  Occurs daily  Never had these sx before   Not progressing or improving  Tried tumeric and tylenol  postpartum - delivery Nov 2018 (6 months ago)    REVIEW OF SYSTEMS  General: no weight changes, fatigue  HEENT:  no HA,  no vision changes, URI sx  Respiratory:  no cough, dyspnea  Cardiovascular: no chest pain, palpitations  Gastrointestinal: no nausea/vomiting, diarrhea/constipation  : no dysuria, no vaginal d/c  Neurologic: no seizures, focal weakness, tremors  Skin: no rashes    On Depo provera for contraception      Current Outpatient Medications   Medication Sig     docusate sodium (COLACE) 100 MG capsule Take 1 capsule (100 mg total) by mouth daily.     hydrocortisone 0.5 % cream Apply to rash as needed.     ibuprofen (ADVIL,MOTRIN) 800 MG tablet Take 1 tablet (800 mg total) by mouth every 8 (eight) hours.     prenat.vits,sinan,min-iron-folic (PRENATAL VITAMIN) Tab Take 1 tablet by mouth daily.     Vitals:    06/04/19 1129   BP: 100/68   Pulse: 93   Resp: 18   Temp: 97.9  F (36.6  C)   TempSrc: Oral   SpO2: 98%   Weight: 116 lb (52.6 kg)   Height: 5' (1.524 m)     PHYSICAL EXAM   General Appearance: Awake and alert, in no acute distress  HEENT: neck is supple  CV: regular rate  Resp: No respiratory distress. Breathing comfortably  Musculoskeletal: moving limbs comfortably with not deficits or deformities  Skin: no rashes noted    A/P  1. Pain in both upper extremities  Morning joint stiffness of hand, unspecified laterality  Encouraged to restart PNV  Tylenol and ibuprofen prn  - Thyroid Cascade  - Vitamin D, Total (25-Hydroxy)  - Sedimentation Rate  - Rheumatoid Factor Quant  - Antinuclear Antibody (JUDITH) Cascade  - HM1(CBC and Differential)  - Comprehensive Metabolic Panel  - HM1 (CBC with Diff)    Contraception - depo  Breast  feeding - refill PNV

## 2021-05-30 VITALS — HEIGHT: 62 IN | WEIGHT: 134 LBS | BODY MASS INDEX: 24.66 KG/M2

## 2021-05-30 VITALS — BODY MASS INDEX: 24.29 KG/M2 | WEIGHT: 132 LBS | HEIGHT: 62 IN

## 2021-05-30 VITALS — BODY MASS INDEX: 24.93 KG/M2 | WEIGHT: 135.5 LBS | HEIGHT: 62 IN

## 2021-05-30 VITALS — HEIGHT: 62 IN | BODY MASS INDEX: 25.35 KG/M2 | WEIGHT: 137.75 LBS

## 2021-05-30 VITALS — BODY MASS INDEX: 26.34 KG/M2 | WEIGHT: 139.5 LBS | HEIGHT: 61 IN

## 2021-05-30 VITALS — BODY MASS INDEX: 26.76 KG/M2 | WEIGHT: 141.75 LBS | HEIGHT: 61 IN

## 2021-05-30 VITALS — WEIGHT: 142.25 LBS | BODY MASS INDEX: 26.86 KG/M2 | HEIGHT: 61 IN

## 2021-05-30 VITALS — HEIGHT: 61 IN | WEIGHT: 138.5 LBS | BODY MASS INDEX: 26.15 KG/M2

## 2021-05-30 VITALS — BODY MASS INDEX: 26.67 KG/M2 | WEIGHT: 141.25 LBS | HEIGHT: 61 IN

## 2021-05-31 VITALS — WEIGHT: 117 LBS | BODY MASS INDEX: 19.97 KG/M2 | HEIGHT: 64 IN

## 2021-05-31 VITALS — HEIGHT: 60 IN | WEIGHT: 111 LBS | BODY MASS INDEX: 21.79 KG/M2

## 2021-05-31 VITALS — WEIGHT: 111.75 LBS | BODY MASS INDEX: 19.18 KG/M2

## 2021-06-01 VITALS — WEIGHT: 107 LBS | HEIGHT: 60 IN | BODY MASS INDEX: 21.01 KG/M2

## 2021-06-01 VITALS — WEIGHT: 109.75 LBS | HEIGHT: 60 IN | BODY MASS INDEX: 21.55 KG/M2

## 2021-06-01 VITALS — BODY MASS INDEX: 24.54 KG/M2 | HEIGHT: 60 IN | WEIGHT: 125 LBS

## 2021-06-01 VITALS — HEIGHT: 60 IN | BODY MASS INDEX: 20.66 KG/M2 | WEIGHT: 105.25 LBS

## 2021-06-01 VITALS — WEIGHT: 116.25 LBS | HEIGHT: 60 IN | BODY MASS INDEX: 22.82 KG/M2

## 2021-06-02 VITALS — BODY MASS INDEX: 25.62 KG/M2 | HEIGHT: 60 IN | WEIGHT: 130.5 LBS

## 2021-06-02 VITALS — HEIGHT: 60 IN | WEIGHT: 136.12 LBS | BODY MASS INDEX: 26.72 KG/M2

## 2021-06-02 VITALS — BODY MASS INDEX: 22.87 KG/M2 | WEIGHT: 116.5 LBS | HEIGHT: 60 IN

## 2021-06-02 VITALS — BODY MASS INDEX: 26.01 KG/M2 | HEIGHT: 60 IN | WEIGHT: 132.5 LBS

## 2021-06-02 VITALS — HEIGHT: 60 IN | BODY MASS INDEX: 26.8 KG/M2 | WEIGHT: 136.5 LBS

## 2021-06-03 VITALS — WEIGHT: 116 LBS | BODY MASS INDEX: 22.78 KG/M2 | HEIGHT: 60 IN

## 2021-06-03 NOTE — TELEPHONE ENCOUNTER
Patient scheduled to receive Depo at 19 [facility] appointment.    It appears order  in September.  Will Provider place order if indicated?    Flu shot will be offered at appt.

## 2021-06-04 VITALS
DIASTOLIC BLOOD PRESSURE: 60 MMHG | BODY MASS INDEX: 22.52 KG/M2 | HEART RATE: 106 BPM | OXYGEN SATURATION: 98 % | WEIGHT: 114.7 LBS | SYSTOLIC BLOOD PRESSURE: 90 MMHG | TEMPERATURE: 97.8 F | RESPIRATION RATE: 14 BRPM | HEIGHT: 60 IN

## 2021-06-05 VITALS
HEIGHT: 60 IN | RESPIRATION RATE: 20 BRPM | SYSTOLIC BLOOD PRESSURE: 94 MMHG | WEIGHT: 134.5 LBS | DIASTOLIC BLOOD PRESSURE: 60 MMHG | BODY MASS INDEX: 26.41 KG/M2 | TEMPERATURE: 98.5 F | HEART RATE: 91 BPM | OXYGEN SATURATION: 98 %

## 2021-06-05 VITALS
SYSTOLIC BLOOD PRESSURE: 100 MMHG | BODY MASS INDEX: 26.7 KG/M2 | RESPIRATION RATE: 20 BRPM | HEIGHT: 60 IN | WEIGHT: 136 LBS | DIASTOLIC BLOOD PRESSURE: 70 MMHG | OXYGEN SATURATION: 99 % | HEART RATE: 106 BPM | TEMPERATURE: 98.1 F

## 2021-06-05 VITALS
HEART RATE: 88 BPM | HEIGHT: 60 IN | RESPIRATION RATE: 16 BRPM | TEMPERATURE: 98.2 F | DIASTOLIC BLOOD PRESSURE: 66 MMHG | OXYGEN SATURATION: 98 % | WEIGHT: 137 LBS | SYSTOLIC BLOOD PRESSURE: 108 MMHG | BODY MASS INDEX: 26.9 KG/M2

## 2021-06-05 VITALS
BODY MASS INDEX: 26.8 KG/M2 | HEIGHT: 60 IN | RESPIRATION RATE: 20 BRPM | DIASTOLIC BLOOD PRESSURE: 68 MMHG | WEIGHT: 136.5 LBS | TEMPERATURE: 97.9 F | HEART RATE: 103 BPM | SYSTOLIC BLOOD PRESSURE: 100 MMHG | OXYGEN SATURATION: 98 %

## 2021-06-05 VITALS
TEMPERATURE: 98.2 F | RESPIRATION RATE: 20 BRPM | HEART RATE: 84 BPM | WEIGHT: 135.75 LBS | DIASTOLIC BLOOD PRESSURE: 60 MMHG | BODY MASS INDEX: 26.65 KG/M2 | HEIGHT: 60 IN | SYSTOLIC BLOOD PRESSURE: 100 MMHG

## 2021-06-05 VITALS
DIASTOLIC BLOOD PRESSURE: 60 MMHG | WEIGHT: 134 LBS | BODY MASS INDEX: 26.31 KG/M2 | HEART RATE: 97 BPM | HEIGHT: 60 IN | RESPIRATION RATE: 24 BRPM | OXYGEN SATURATION: 97 % | TEMPERATURE: 98.1 F | SYSTOLIC BLOOD PRESSURE: 90 MMHG

## 2021-06-05 VITALS
SYSTOLIC BLOOD PRESSURE: 96 MMHG | BODY MASS INDEX: 24.94 KG/M2 | WEIGHT: 127 LBS | HEART RATE: 84 BPM | RESPIRATION RATE: 20 BRPM | OXYGEN SATURATION: 98 % | TEMPERATURE: 97.9 F | HEIGHT: 60 IN | DIASTOLIC BLOOD PRESSURE: 62 MMHG

## 2021-06-05 VITALS
BODY MASS INDEX: 26.7 KG/M2 | DIASTOLIC BLOOD PRESSURE: 60 MMHG | TEMPERATURE: 98.1 F | HEART RATE: 90 BPM | RESPIRATION RATE: 20 BRPM | SYSTOLIC BLOOD PRESSURE: 94 MMHG | WEIGHT: 136 LBS | HEIGHT: 60 IN | OXYGEN SATURATION: 98 %

## 2021-06-05 VITALS
BODY MASS INDEX: 22.92 KG/M2 | HEART RATE: 108 BPM | HEIGHT: 60 IN | OXYGEN SATURATION: 99 % | RESPIRATION RATE: 16 BRPM | SYSTOLIC BLOOD PRESSURE: 98 MMHG | TEMPERATURE: 98.6 F | WEIGHT: 116.75 LBS | DIASTOLIC BLOOD PRESSURE: 64 MMHG

## 2021-06-05 VITALS
HEIGHT: 60 IN | WEIGHT: 136.75 LBS | RESPIRATION RATE: 20 BRPM | TEMPERATURE: 98 F | OXYGEN SATURATION: 97 % | HEART RATE: 111 BPM | BODY MASS INDEX: 26.85 KG/M2 | SYSTOLIC BLOOD PRESSURE: 90 MMHG | DIASTOLIC BLOOD PRESSURE: 60 MMHG

## 2021-06-07 NOTE — TELEPHONE ENCOUNTER
Patient was NS for 11/20/19 [facility] appointment so Depo not administered.  No subsequent or future appointments noted.

## 2021-06-08 NOTE — PROGRESS NOTES
Christiano Garner presents to clinic today by herself. With the assistance of a Steffany  she voices that she is feeling generally well and has no concerns. She has continued to administer colace daily and voices that this has helped with her issues of constipation. Reviewed labs from last visit and all questions answered. Christiano Garner denies any contractions, vaginal bleeding, loss of fluid or changes in fetal movement. Reviewed weight gain and appropriate intake. Proof of pregnancy letter requested an given today.  Initiated 32 week pregnancy checklist and reviewed sxs of  labor, danger signs and numbers to call. Christiano Garner encouraged to return to clinic in 2 weeks or sooner with any questions or concerns.      Patient was seen with student, YUNIOR Bolanos who was present for learning. I personally assessed, examined and made clinical decisions reflected in the documentation. BYRON Meredith,CNM

## 2021-06-08 NOTE — PROGRESS NOTES
PRENATAL VISIT   FIRST OBSTETRICAL EXAM - OB    Assessment / Impression     First prenatal visit at 25w1d    Late initiation of care    Plan:     -RX colace and miralax sent to pharmacy, reviewed instructions for use with patient.   -IOB labs drawn.  -Pt is interested in drawing lead level.  -Did not discuss WB today, please discuss at NV and draw Hep C with 28 weeks labs PRN.   -Reviewed prenatal care schedule.  -Optimal nutrition and weight gain discussed. Pregnancy weight gain of 25-35 lbs (BMI 18.5-24.9) encouraged.   -Anticipatory guidance for common pregnancy questions and concerns reviewed.   -Danger s/sx for second trimester reviewed with patient.  -Genetic screening options not reviewed d/t late initiation of care  -IOB packet given contents reviewed with patient.  -CNM services and hospital options reviewed; emergency and scheduling phone numbers given to patient.  -Return to clinic 3 weeks for one hour GCT, hgb, RPR, instructions reviewed with patient.     Total time spent with patient: 45 minutes, >50% time spent counseling and coordinating care.    Subjective:    Christiano Renae is a 21 y.o.  here today for her First Obstetrical Exam.  Feeling well now. Only concern is constipation.  Reports eating diet high in fiber and hydrates well, would like RX for stool softener.  Taking PNV daily. Feeling safe at home.  Reports a lot of FM at night.  Had normal fetal survey, results reviewed with patient today.  Discussed EDB, agrees with 17.  They weren't able to see the genitialia well, but think that it is a boy.  Patient has never had pap, but due to gestational age, patient agrees with plan to do pap smear postpartum.  Plans to breastfeed.    Education level: 10th grade in [employer]  Occupation: PCA  Partners name: Gallo Sourav    OB History    Para Term  AB SAB TAB Ectopic Multiple Living   1 0 0 0 0 0 0 0 0 0      # Outcome Date GA Lbr Adeel/2nd Weight Sex Delivery Anes PTL Lv   1 Current         "           Expected Date of Delivery: 4/25/2017, by Ultrasound    No past medical history on file.  No past surgical history on file.  Social History   Substance Use Topics     Smoking status: Never Smoker     Smokeless tobacco: Never Used     Alcohol use No     Current Outpatient Prescriptions   Medication Sig Dispense Refill     prenatal multivit-Ca-min-Fe-FA (PRENATAL VITAMIN) Tab Take 1 tablet by mouth daily. 90 each 3     docusate sodium (COLACE) 100 MG capsule Take 1 capsule (100 mg total) by mouth daily. 30 capsule 0     polyethylene glycol (MIRALAX) 17 gram packet Take 1 packet (17 g total) by mouth daily for 3 days. 14 each 1     No current facility-administered medications for this visit.      No Known Allergies          Pregnancy Risk Factors: Non-English speaking and Less than 12th grade education    Review of Systems  General:  Denies problem  Eyes: Denies problem  Ears/Nose/Throat: Denies problem  Cardiovascular: Denies problem  Respiratory:  Denies problem  Gastrointestinal:  Denies problem  Genitourinary: Denies problem  Musculoskeletal:  Denies problem  Skin: Denies problem  Neurologic: Denies problem  Psychiatric: Denies problem  Endocrine: Denies problem  Heme/Lymphatic: Denies problem   Allergic/Immunologic: Denies problem       Objective:   Objective    Vitals:    01/09/17 1317   BP: 112/62   Pulse: 92   Weight: 132 lb (59.9 kg)   Height: 5' 1.5\" (1.562 m)     Physical Exam:  General Appearance: Alert, cooperative, no distress, appears stated age  Head: Normocephalic, without obvious abnormality, atraumatic  Eyes: Conjunctiva/corneas clear, does not wear corrective lenses  Neck: Supple, symmetrical, trachea midline, no adenopathy  Thyroid: not enlarged, symmetric, no tenderness/mass/nodules  Back: Symmetric, no curvature, ROM normal, no CVA tenderness  Lungs: Clear to auscultation bilaterally, respirations unlabored  Heart: Regular rate and rhythm, S1 and S2 normal, no murmur, rub, or " gallop  Breasts: No breast masses, tenderness, asymmetry, or nipple discharge. Nipples are not everted, inverted.  Abdomen: Soft, non-tender, bowel sounds active all four quadrants, no masses.   FHT: 148   Vulva: no sign of lesions or condyloma, normal hair distribution  Vagina: pink, normal rugae, no abnormal discharge  Cervix: long/thick/closed, no lesions or inflammation noted, negative CMT with exam  Uterus: non tender, enlarged approximately 25 weeks size  Adnexa:  Not palpated d/t gestational age  Pelvic spines:AVERAGE  Sacrum:CONCAVE  Suprapubic Arch:NORMAL  Pelvis type:gynecoid            Extremities: Extremities normal, atraumatic, no cyanosis or edema  Skin: Skin color, texture, turgor normal, no rashes or lesions  Lymph nodes: Cervical, supraclavicular, and axillary nodes normal  Neurologic: Alert and oriented x 3.    Lab:   Results for orders placed or performed in visit on 01/09/17   Urine Culture   Result Value Ref Range    Culture No Growth    Chlamydia trachomatis & Neisseria gonorrhoeae, Amplified Detection   Result Value Ref Range    Chlamydia trachomatis, Amplified Detection Negative Negative    Neisseria gonorrhoeae, Amplified Detection Negative Negative   HIV Antigen/Antibody Screening Cascade   Result Value Ref Range    HIV Antigen / Antibody Negative Negative   Lead, Blood   Result Value Ref Range    Lead 2.2 <5.0 ug/dL    Collection Method Venous    Syphilis Screen, Cascade   Result Value Ref Range    Syphilis Screen Cascade Non-Reactive Non-Reactive   HM1 (CBC with Diff)   Result Value Ref Range    WBC 10.0 4.0 - 11.0 thou/uL    RBC 3.81 3.80 - 5.40 mill/uL    Hemoglobin 11.4 (L) 12.0 - 16.0 g/dL    Hematocrit 33.4 (L) 35.0 - 47.0 %    MCV 88 80 - 100 fL    MCH 29.8 27.0 - 34.0 pg    MCHC 34.1 32.0 - 36.0 g/dL    RDW 12.5 11.0 - 14.5 %    Platelets 271 140 - 440 thou/uL    MPV 6.8 (L) 7.0 - 10.0 fL    Neutrophils % 77 (H) 50 - 70 %    Lymphocytes % 16 (L) 20 - 40 %    Monocytes % 5 2 - 10 %     Eosinophils % 1 0 - 6 %    Basophils % 1 0 - 2 %    Neutrophils Absolute 7.7 2.0 - 7.7 thou/uL    Lymphocytes Absolute 1.6 0.8 - 4.4 thou/uL    Monocytes Absolute 0.5 0.0 - 0.9 thou/uL    Eosinophils Absolute 0.1 0.0 - 0.4 thou/uL    Basophils Absolute 0.1 0.0 - 0.2 thou/uL   Rubella Immune Status (IgG)   Result Value Ref Range    Rubella IgG Non - Immune (!) Immune   Hepatitis B Surface Antigen (HBsAG)   Result Value Ref Range    Hepatitis B Surface Ag Negative Negative   HML ABO/Rh Typing   Result Value Ref Range    HML ABO/Rh Typing A POS     HML ABO/Rh Repeat Typing A POS    HML Antibody Screen   Result Value Ref Range    HML Antibody Screen Negative Negative

## 2021-06-08 NOTE — PROGRESS NOTES
Christiano Renae is here for GENIE at 27w6d. Here with professional Steffany . No specific questions today. Started colace; feeling better and last BM last night. Denies contractions, bleeding, leaking fluid. Reviewed weight gain chart with patient, slightly above recs. She is not active. Encouraged to add in a 20 min walk daily indoors if needed due to weather. 28-week check list completed. Tdap discussed and accepted today. 1-hour GCT/Hgb/RPR collected today. RTC 2 weeks.

## 2021-06-09 NOTE — PROGRESS NOTES
Christiano Renae presents to clinic today by herself. With the assistance of a Steffany  she voices that she is feeling generally well and has no concerns. She states she has been utilizing Tums PRN, which has assisted with her issues of heartburn. She voices that she has experienced issues of having shortness of breath when laying flat on her back. Reviewed safe and comfortable positions during pregnancy and all questions answered. She denies any regular uterine contractions, vaginal bleeding or loss of fluid.  Patient reports a slight decrease in movement, but continues to feel baby several times throughout the day.  Significant fetal movement noted during Leopold's, but not felt by patient.  Fetal movement awareness reviewed in detail, also encouraged patient to put her hands on her belly to feel if concerned, reviewed when to call.  Christiano Renae voices she does not yet have a carseat. Reviewed the importance of obtaining a carseat prior to delivery. Also instructed patient to bring her purchase receipt to the clinic for her to receive a reimbursement from her insurance. Patient voices understanding and all questions answered. 36 week pregnancy checklist completed. Fundal height measuring low at 33.5cm, however interval growth appropriate and baby feels AGA via Leopold's today. Will consider growth US at next visit if fundal height continues to be low. GBS collected today and SVE performed. Patient to lab for hgb collection. Reviewed sxs of labor, danger signs and numbers to call. Christiano Renae instructed to return to clinic in 1 week or sooner with any questions or concerns.     Patient was seen with student, YUNIOR Bolanos who was present for learning. I personally assessed, examined and made clinical decisions reflected in the documentation. BYRON Meredith,CNM

## 2021-06-09 NOTE — PROGRESS NOTES
Christiano Renae presents to clinic today by herself. With the assistance of a professional Steffany , she voices she is feeling generally well and has no concerns. She states her issues with constipation has passed since utilizing daily Colace. She is requesting a refill on her Colace and PNV prescriptions today. Christiano Renae denies any uterine contractions, vaginal bleeding, loss of fluid or changes in fetal movement. Reviewed sxs of  labor, danger signs and numbers to call. All questions answered. 32 week pregnancy checklist completed. Christiano Renae encouraged to return to clinic in two weeks or sooner with any questions or concerns.    Patient was seen with student, YUNIOR Bolanos who was present for learning. I personally assessed, examined and made clinical decisions reflected in the documentation. BYRON Meredith,CNM

## 2021-06-09 NOTE — PROGRESS NOTES
Christiano Renae presents to clinic today for a routine prenatal visit. Professional  present for visit today. Feeling well.  Reports + fetal movement daily. Denies any regular contractions, bleeding or LOF. Reports decreased appetite, eating small snacks when able, but hydrating adequately, reassurance re: adequate weight gain.  Difficult with sleep some nights.  Reviewed comfort measures, patient declines need for an medication to help.  Warning sxs and labor sxs and when to call reviewed, patient has CNM contact information and ambulance form.  Anticipatory guidance re: term pregnancy reviewed.  Getting things ready at home for baby.  Plans to exclusively breastfeed, discussed breast pump, patient feels she would use if she had it, given today, will bring to hospital to learn how to use it.

## 2021-06-09 NOTE — PROGRESS NOTES
Christiano Garner Oc is here for GENIE visit at 36w6d. Here with professional . Feeling ok , some fatigue as she went to bed late last night and woke up early. Normally goes to bed around 9pm. Encouraged sleep hygiene and going to bed earlier as she normally does. Wondering if it is normal to have decreased appetite at this point in pregnancy; reviewed physiologic changes that contribute to decreased appetite/inability to consume large meals. Encouraged smaller, frequent meals. Reviewed negative GBS results and normal Hgb from last visit. Pt wondering about car seat program as she has her receipt. Discussed with patient that this program was canceled, she understands. FH: 34.5cm, increased from last week. No leaking water, bleeding, contractions. Normal FM.  Reviewed early versus active labor signs. Ambulance form given, as well as [facility] phone number. RTC 1 week.

## 2021-06-09 NOTE — PROGRESS NOTES
Christiano Renae presents to clinic today for a routine prenatal visit. Steffany  present for visit today. Feeling okay.  C/O heartburn, especially when she eats a big meal and when she is in bed at night, comfort measures reviewed and encouraged use of TUMS as needed, will assess need for RX for acid reducer at NV if TUMS not effective.  Reports + fetal movement daily. Denies any regular contractions, bleeding or LOF. Reports good appetite, eating smaller more frequent meals now because of the heartburn and is hydrating adequately. Fundal height slightly low today, will watch and consider U/S at NV PRN.  Does not yet have car seat, discussed insurance reimbursement and encouraged patient to bring receipt to clinic.  EPDS score 2, feeling well emotionally and has good support at home. Discussed postpartum birth control options that are most compatible with breastfeeding, patient will likely use Depo.  Pre-registration completed today. Warning sxs and when to call reviewed, patient has CNM contact information.  Anticipatory guidance re: 3rd trimester provided today.  Discussed GBS, hgb and cervical exam at NV.    BYRON Meredith,CNM

## 2021-06-10 NOTE — PROGRESS NOTES
Christiano Renae is here with professional Steffany  for GENIE visit. Baby active, has car seat, [facility] reviewed, disc PP immunizations. Disc car seat.  Labor instructions reviewed.

## 2021-06-11 NOTE — PROGRESS NOTES
[facility] Routine 6 Week Postpartum Exam    Assessment:  21 y.o. year-old    Normal 6 week postpartum exam  Birth Control Counseling     Plan:  1. Pap smear done at today's visit.  2. Desired contraception: none, may use condoms.  Will make appointment if she wants to initiate Depo which was her original plan.  Aware of return to fertility.  3. Discussed resumption of exercise, reviewed and kegels and abdominal exercises and encouraged walking.  4.  Resumption of intercourse reviewed with possible changes in libido and vaginal lubrication while nursing.  5.  Nutrition and supplements reviewed.  Advised continuation of a prenatal vitamin  6.  Adjustment to parenting, self care and open communication with her support system discussed. Warning signs and symptoms related to postpartum mood disorders reviewed.     TT with patient 40 minutes, >50% CCC    Subjective:  Christiano Renae is a 21 y.o. year-old  who presents to clinic today for her 6 week postpartum exam.    Birth experience: Happy with birth and care she received while at the [facility].    Depression/Anxiety: Denies, feels well emotionally.   EPDS: 0/never  Support: Has good support from family at home.  Adjustment: Adjusting well to being a mom.    Sleep: Baby up a lot at night still, not able to nap well during the day.  Not getting quite enough but tolerable.    Nutrition/Hydration: Eating and hydrating well.    Infant feeding: Breast, also pumping and giving pumped breast milk  Breasts: No concerns, no sxs of mastitis, denies nipple pain    Physical Healing: Feels well, no concerns.  Exercise/Activity: Limited thus far, will start being more active now.  Intimacy: Has had unprotected intercourse. No pain  Plans for postpartum contraception: Nothing or condoms, had wanted to initiate depo, but now  wants another baby soon.  Will go home e and discuss with him the recommendations for pregnancy spacing and make appointment for birth control management as  "desired.  Aware of return to fertility and will use condoms for now.     Objective:  /68 (Patient Site: Right Arm, Patient Position: Sitting, Cuff Size: Adult Regular)  Pulse 76  Ht 5' 4\" (1.626 m)  Wt 117 lb (53.1 kg)  LMP 08/16/2016 (Approximate)  Breastfeeding? Yes  BMI 20.08 kg/m2  Physical Exam:  General Appearance: Alert, cooperative, no distress, appears stated age  Skin: Skin color, texture, turgor normal, no rashes or lesions  Throat: Lips, mucosa, and tongue normal; teeth and gums normal  HEENT: grossly normal; otoscopic and opthalmic exam not performed.   Neck: Supple, symmetrical, trachea midline, no adenopathy;  thyroid: not enlarged, symmetric, no tenderness/mass/nodules  Lungs: Clear to auscultation bilaterally, respirations unlabored  Breasts: No breast masses, tenderness, asymmetry, or nipple discharge.  Heart: Regular rate and rhythm, S1 and S2 normal, no murmur, rub, or gallop  Abdomen: Soft, non-tender.   Pelvic:External genitalia normal without lesions or irritation. Vagina and cervix show no lesions, inflammation, discharge or tenderness. 2nd degree perineal laceration well approximated and well healed.   No cystocele, No rectocele. Uterus fully involuted.  No adnexal mass or tenderness.  Extremities: Extremities normal without varicosities or edema    BYRON Meredith,CNM                          "

## 2021-06-11 NOTE — PROGRESS NOTES
"HPI - 23 yo female here with concerns about possible pregnancy    H/o 2 pregnancies -  x2  Last delivery 18 with Dr. Umaña and currently breast feeding  Trying to get pregnant  LMP 20  Not taking PNV    Current Outpatient Medications   Medication Sig     docusate sodium (COLACE) 100 MG capsule Take 1 capsule (100 mg total) by mouth daily.     hydrocortisone 0.5 % cream Apply to rash as needed.     prenat.vits,sinan,min-iron-folic (PRENATAL VITAMIN) Tab Take 1 tablet by mouth daily.     Vitals:    20 1432   BP: 90/60   Pulse: (!) 106   Resp: 14   Temp: 97.8  F (36.6  C)   TempSrc: Oral   SpO2: 98%   Weight: 114 lb 11.2 oz (52 kg)   Height: 5' 0.39\" (1.534 m)     PHYSICAL EXAM   General Appearance: Awake and alert, in no acute distress  HEENT: neck is supple  CV: regular rate  Resp: No respiratory distress. Breathing comfortably  Musculoskeletal: moving limbs comfortably with not deficits or deformities  Skin: no rashes noted    Recent Results (from the past 1008 hour(s))   Pregnancy, Urine    Collection Time: 20  2:44 PM   Result Value Ref Range    Pregnancy Test, Urine Positive (!) Negative       A/P  pregnant   with LMP - 20, GA 12 wk 0d, ROB 3/23/21  rx PNV  Order dating OB u/s  Schedule IOB appt with Dr. Umaña  Letter for county worker confirming pregnancy  flu shot    "

## 2021-06-11 NOTE — PROGRESS NOTES
2 months post partum  One week urine red and dark yellow.  No dysuria.  No frequency.  No fever.  Does have low back pain.  No vaginal sxs.  No night urine    One child.    ROS: as noted above    OBJECTIVE:   Vitals:    07/06/17 1408   BP: 90/72   Pulse: 88   Resp: 20   Temp: 98.5  F (36.9  C)      Head: atraumatic   Eyes: nl eom, anicteric   Ears: nl external ears   Neck: nl nodes, supple   Lungs: clear to ausc   Heart: regular rhythm  Back: no tenderness  Abd: soft nontender   Joints: uninflamed   Ext: nontender calves   Mental: euthymic  Neuro: no weakness  Gait: normal  ua consistent with but not diagnostic of uti  ASSESSMENT/PLAN:    1. UTI (urinary tract infection)  Urinalysis-UC if Indicated    Culture, Urine    sulfamethoxazole-trimethoprim (SEPTRA DS) 800-160 mg per tablet   2. Immunization due  HPV vaccine 9 valent 3 dose IM     Anticipate resolution otherwise return.  Return sooner if symptoms worsen.  More than 10 of fifteen total minutes time spent education counseling regarding the issues and care of same as listed in the assessment and plan of this note

## 2021-06-12 NOTE — PROGRESS NOTES
Assessment/Plan:        Diagnoses and associated orders for this visit:      History and physical examination, immigration- Immunizations reviewed and are up to date.  [employer] [id number] completed and given to pt in sealed envelope.  Copy scanned into chart, copy given to pt for their records.  RTC to PCP for routine scheduled care, sooner prn.                 Subjective:    Patient ID: Christiano Renae is a 21 y.o. female    HPI Pt is here for a green card exam, came to [place] as a refugee.  No concerns about her health.     The following portions of the patient's history were reviewed and updated as appropriate: allergies, current medications, past family history, past medical history, past social history, past surgical history and problem list.    Review of Systems      Neg other than HPI.     Objective:    Physical Exam          Patient is in no apparent physical distress.  Vitals are as recorded.  Head and face are normal.  Conjunctiva are clear.  Neck is supple.  Resp rate and effort normal.  Extremities are without edema.  Gait is normal.  Skin is without rashes.  Mood and affect are appropriate.

## 2021-06-12 NOTE — PROGRESS NOTES
New OB Clinic Note - 10/9/2020   Visit was completed along with Steffany .   SUBJECTIVE:  Christiano Renae is a 24 y.o.  female @ 12w1d who is here for new OB visit.   LMP 20. States she had her Ultrasound at [facility] on . Results are not in our record yet. She was told her ROB was .  Current Concerns:   The back of her neck is sore and feels tight, heavy sometimes. She hasn't tried any massage or any medications.   Her nausea and vomiting is improving.   She denies bleeding, cramping. No loss of fluid. She denies HA.   Denies constipation.   OB History:  Patient is . Prior Pregnancies:  OB History    Para Term  AB Living   3 2 2 0 0 2   SAB TAB Ectopic Multiple Live Births   0 0 0 0 2      # Outcome Date GA Lbr Adeel/2nd Weight Sex Delivery Anes PTL Lv   3 Current            2 Term 18 39w0d  6 lb 13 oz (3.09 kg) F Vag-Spont IV N MANNY   1 Term 17 39w4d 09:55 / 00:42 7 lb 7 oz (3.374 kg) M Vag-Spont Local N MANNY      Birth Comments: none     She does not have a history of  birth before 37 weeks with a lennon gestation.  She does not have a history of preeclampsia in prior pregnancy.   She does not have a history of recurrent (>2) pregnancy losses.  She does not have a history of Down's syndrome, sickle cell anemia or other genetic disorder affecting a child in her family.  She does not have a history of major depression or postpartum depression.  Social Hx:   She has support from her family and father of baby is involved.   She is a stay at home mom.  She has not used tobacco, has not used EtOH and has not used illicit drugs.  Current Medications: prenatal vitamins  Past Medical History:   Diagnosis Date     Depo-Provera contraceptive status 2019     Fetal cardiac echogenic focus 2018     Medical history non-contributory      Varicella     Immune per lab 16      Past Surgical History:   Procedure Laterality Date     NO PAST SURGERIES        Family History   Problem Relation Age of Onset     No Medical Problems Mother      No Medical Problems Father      No Medical Problems Sister      No Medical Problems Brother      No Medical Problems Son      Current Outpatient Medications on File Prior to Visit   Medication Sig Dispense Refill     -iron-folate-dha 90 mg iron- 1 mg-200 mg cap Take 1 capsule by mouth daily. 90 capsule 3     prenat.vits,sinan,min-iron-folic (PRENATAL VITAMIN) Tab Take 1 tablet by mouth daily. 100 each 3     docusate sodium (COLACE) 100 MG capsule Take 1 capsule (100 mg total) by mouth daily. 30 capsule 0     hydrocortisone 0.5 % cream Apply to rash as needed. 30 g 1     Current Facility-Administered Medications on File Prior to Visit   Medication Dose Route Frequency Provider Last Rate Last Dose     medroxyPROGESTERone injection 150 mg (DEPO-PROVERA)  150 mg Intramuscular Q3 Months Nia Roy MD         ?  OBJECTIVE:  Vitals:    10/09/20 1020   BP: 98/64   Pulse: (!) 108   Resp: 16   Temp: 98.6  F (37  C)   TempSrc: Oral   SpO2: 99%   Weight: 116 lb 12 oz (53 kg)   Height: 5' (1.524 m)     Gen: Awake, alert, in no acute distress.  HEENT: oral mucosa is moist and pink, dentition is adequate   -Cervical paraspinous muscles tight on palpation.   Neck: Thyroid is non-enlarged, without nodules or tenderness  CV: RRR with normal S1 and S2. No murmurs appreciated.  Resp: Lungs are clear to auscultation bilaterally without crackles or wheezing.  Abd: non-tender, non-distended. Bowel sounds present.   Pelvic Exam: Vagina and vulva are normal; no discharge is noted. Cervix normal without lesions. Uterus anteverted and mobile, normal in size and shape without tenderness. Adnexa normal in size without masses or tenderness. She is due for a pap smear today.  Lower extremities: No edema  Neuro: no focal deficits    Results for orders placed or performed in visit on 09/08/20   Pregnancy, Urine   Result Value Ref Range     Pregnancy Test, Urine Positive (!) Negative     ASSESSMENT/PLAN:  Christiano Renae is a 24 y.o.  at 12w1d weeks by early ultrasound, according to patient. Will await results. Estimated Date of Delivery: 21.  1. Discussed recommended weight gain, healthy eating habits, exercise, common first trimester concerns (nausea, vomiting, constipation, fatigue, GERD, urinary frequency) and warning signs for miscarriage and  labor (bleeding, cramping).   2. Reviewed eligibility for low-dose aspirin therapy for prevention of preeclampsia (preeclampsia in prior pregnancy, pre-existing HTN or DM, or multiple other risk factors including  delivery, chronic HTN, DM, obesity, low socioeconomic status, non- ethnicity). Patient is not a candidate for this.   3. First trimester screening done, awaiting results from [facility].   4. Counseled on benefits of breastfeeding. Patient is planning to breastfeed.     Christiano was seen today for initial prenatal visit.    Diagnoses and all orders for this visit:    Encounter for supervision of other normal pregnancy in first trimester  -     Chlamydia/gonorrhoeae CERVIX  -     Hepatitis B Surface antigen (HBsAG)  -     HIV Antigen/Antibody Screening Cascade  -     HM1(CBC and Differential)  -     HML Antibody Screen  -     Lead, Blood  -     RPR  -     Rubella Immune Status (IgG)  -     Urinalysis Macroscopic  -     Culture, Urine  -     Drugs of Abuse 1,Urine  -     acetaminophen (TYLENOL EXTRA STRENGTH) 500 MG tablet; Take 2 tablets (1,000 mg total) by mouth every 6 (six) hours as needed for pain.  -     Lead, Blood, Venous    Cervical cancer screening  -     Gynecologic Cytology (PAP Smear)    Neck sprain, initial encounter: I demonstrated a massage of these muscles and encouraged her to ask her  to do this for her, which she says he would do if she asked him. Also encouraged her to make a heating pad with putting raw rice in a sock or cloth bag and put it in the  microwave. Can also use tylenol.     Offered PT but she declines at this point.     ?  RTC in 4 weeks or sooner if problems.    Erica Umaña MD  Options for treatment and follow-up care were reviewed with the patient and/or guardian. Christiano Garner Paw and/or guardian engaged in the decision making process and verbalized understanding of the options discussed and agreed with the final plan.

## 2021-06-12 NOTE — PROGRESS NOTES
"Christiano Renae is a 25 y.o. female who is being evaluated via a billable telephone visit.      The patient has been notified of following:     \"This telephone visit will be conducted via a call between you and your physician/provider. We have found that certain health care needs can be provided without the need for a physical exam.  This service lets us provide the care you need with a short phone conversation.  If a prescription is necessary we can send it directly to your pharmacy.  If lab work is needed we can place an order for that and you can then stop by our lab to have the test done at a later time.    Telephone visits are billed at different rates depending on your insurance coverage. During this emergency period, for some insurers they may be billed the same as an in-person visit.  Please reach out to your insurance provider with any questions.    If during the course of the call the physician/provider feels a telephone visit is not appropriate, you will not be charged for this service.\"    Patient has given verbal consent to a Telephone visit? Yes    What phone number would you like to be contacted at? [phone]    Patient would like to receive their AVS by AVS Preference: Mail a copy.    Christiano Renae is doing a telephone visit for pregnancy follow up. No cramping or bleeding.     No nausea or vomiting.     Her neck pain is better but now she is having pain of her lower back. It does not feel like cramping, just feels like an ache.     Still constipated. Having only a couple bowel movements each week.     Assessment/Plan:  1. Encounter for supervision of other normal pregnancy in second trimester: doing well, next visit in 4 weeks.     2. Chronic bilateral low back pain without sciatica: explained this can worsen during pregnancy and may also be related to her picking up or carrying her 2 other young children.  Will further evaluate at next visit and teach her some stretches to try.    3. Constipation, " unspecified constipation type: will add Miralax to her regimen with colace.   - polyethylene glycol (GLYCOLAX) 17 gram/dose powder; Take 17 g by mouth daily.  Dispense: 595 g; Refill: 1        Phone call duration:  7 minutes    Erica Umaña MD

## 2021-06-13 NOTE — PROGRESS NOTES
SUBJECTIVE:  Christiano Renae is a 25 y.o. female  at 24w3d by 12 week ultrasound. Estimated Date of Delivery: 21.  She is doing well. She does not have nausea and vomiting. She denies abdominal pain/cramping, vaginal bleeding, LOF. Fetal movement is present.   Concerns: none  OBJECTIVE:  Blood pressure 96/62, pulse 84, temperature 97.9  F (36.6  C), temperature source Oral, resp. rate 20, height 5' (1.524 m), weight 127 lb (57.6 kg), last menstrual period 2020, SpO2 98 %, not currently breastfeeding.  Gen: comfortable, no acute distress.  See OB Vitals flowsheet.  ASSESSMENT/PLAN:  Christiano was seen today for routine prenatal visit.    Diagnoses and all orders for this visit:    Encounter for supervision of other normal pregnancy in second trimester: ordered fetal survey today.   -     Ambulatory referral to Obstetrics / Gynecology      -IUP at 21w4d:     Prenatal labs reviewed and Normal.       Breast/Bottle: breast     RTC in 4 weeks or sooner with problems.    Visit completed along with assistance of Steffany .    Erica Umaña MD

## 2021-06-14 NOTE — PROGRESS NOTES
SUBJECTIVE:  Christiano Renae is a 25 y.o. female  at 30w0d by 12 week ultrasound. Estimated Date of Delivery: 21.  She is doing well. She denies vaginal bleeding or fluid leaking. Fetal movement is present. She is not having contractions.  Concerns: none  OBJECTIVE:  Blood pressure 100/70, pulse (!) 106, temperature 98.1  F (36.7  C), temperature source Oral, resp. rate 20, height 5' (1.524 m), weight 136 lb (61.7 kg), last menstrual period 2020, SpO2 99 %, not currently breastfeeding.  Comfortable, no acute distress.  See OB Vitals flowsheet.  ASSESSMENT/PLAN:  Christiano was seen today for routine prenatal visit.    Diagnoses and all orders for this visit:    Encounter for supervision of other normal pregnancy in second trimester: fetal survey was just done at 29 weeks due to scheduling problems. Normal except spine not fully visualized and EFW in 20th %ile. Recheck growth in 4 weeks.    -     Tdap vaccine,  6yo or older,  IM  -     Glucose,Gestational Challenge (1 Hour)  -     Hemoglobin  -     Syphilis Screen, Pender      -IUP at 30w0d:     Prenatal labs reviewed and Normal.     1st trimester screen normal. .     Peripartum Anesthesia: the patient does not desire an epidural during labor.     Post-partum Contraception: Depo     Breast/Bottle: breast     Reviewed plans for getting to the hospital.    RTC in 4 weeks or sooner with problems.    Visit completed along with assistance of Steffany .    Erica Umaña MD

## 2021-06-15 PROBLEM — K59.00 CONSTIPATION: Status: ACTIVE | Noted: 2017-01-09

## 2021-06-15 NOTE — TELEPHONE ENCOUNTER
Please call pt and explain her Vitamin D level was quite low. This might be part of why she is feeling so tired. I'm sending a Vitamin D pill to her pharmacy and she should take 1 per day.     Erica Umaña MD

## 2021-06-15 NOTE — PROGRESS NOTES
SUBJECTIVE:  Christiano Renae is a 25 y.o. female  at 37w0d by 12 week ultrasound. Estimated Date of Delivery: 21.  She is doing well. She denies vaginal bleeding or fluid leaking. Fetal movement is present. She is not having contractions.  Concerns: none  OBJECTIVE:  Blood pressure 94/60, pulse 90, temperature 98.1  F (36.7  C), temperature source Oral, resp. rate 20, height 5' (1.524 m), weight 136 lb (61.7 kg), last menstrual period 2020, SpO2 98 %, not currently breastfeeding.  Comfortable, no acute distress.  See OB Vitals flowsheet.  ASSESSMENT/PLAN:  Christiano was seen today for routine prenatal visit.    Diagnoses and all orders for this visit:    Encounter for supervision of other normal pregnancy in third trimester      -IUP at 37w0d:     Prenatal labs reviewed and Normal.     Fetal survey was done at 20 weeks and was normal, though now BPD and HC are in 2nd percentile. Checking again next week.     GBS status is negative.     Peripartum Anesthesia: the patient does not desire an epidural during labor.     Post-partum Contraception: Depo     Breast/Bottle: breast     Reviewed plans for getting to the hospital.    RTC in 1 week or sooner with problems.    Visit completed along with assistance of Steffany .    Erica Umaña MD

## 2021-06-15 NOTE — PROGRESS NOTES
SUBJECTIVE:  Christiano Renae is a 25 y.o. female  at 34w0d by 12 week ultrasound. Estimated Date of Delivery: 21.  She is doing well. She denies vaginal bleeding or fluid leaking. Fetal movement is present. She is not having contractions.  Concerns: tired. Can't always sleep well at night because sometimes one ofher children (age 2) wakes up at night and she needs to care for her. This daughter often takes a late nap, at 4 or 5 and then wakes up at 10 and cant' get back to sleep.   -Her 3 1/2 year old son is awake all day, doesn't nap.    OBJECTIVE:  Blood pressure 90/60, pulse 97, temperature 98.1  F (36.7  C), temperature source Oral, resp. rate 24, height 5' (1.524 m), weight 134 lb (60.8 kg), last menstrual period 2020, SpO2 97 %, not currently breastfeeding.  Comfortable, no acute distress.  See OB Vitals flowsheet.  ASSESSMENT/PLAN:  Christiano was seen today for routine prenatal visit.    Diagnoses and all orders for this visit:    Encounter for supervision of other normal pregnancy in third trimester: recheck EFW, as it was in the 20th %ile at 29 weeks, and check fetal spine.   -     Ambulatory referral to Obstetrics / Gynecology    Pregnancy related fatigue in third trimester: will check the following but discussed that if all is normal, her fatigue is most likely due to pregnancy along with not getting enough sleep. Discussed not letting her daughter nap past 4 pm so that hopefully she will go to sleep better at night.   -     Hemoglobin  -     Thyroid Stimulating Hormone (TSH)  -     Vitamin D, Total (25-Hydroxy)    Constipation, unspecified constipation type  -     docusate sodium (COLACE) 100 MG capsule; Take 1 capsule (100 mg total) by mouth daily.      -IUP at 34w0d:     Prenatal labs reviewed and Normal.      Fetal survey was done at 29 weeks and was normal except EFW in 20th %ile.     Peripartum Anesthesia: the patient does not desire an epidural during labor.     Post-partum Contraception: Depo      Breast/Bottle: breast     RTC in 2 weeks or sooner with problems.    Visit completed along with assistance of Steffany .    Erica Umaña MD

## 2021-06-15 NOTE — TELEPHONE ENCOUNTER
Called Christiano Renae to report her ultrasound showed her baby is very small IUGR, and is not growing well inside. I recommend that at this point, we induce her labor. Discussed what this entails and she agreed.     She will come to the clinic at 4 today for a COVID test and cervical exam.     Erica Umaña MD

## 2021-06-15 NOTE — PROGRESS NOTES
SUBJECTIVE:  Christiano Renae is a 25 y.o. female  at 36w0d by 12 week ultrasound. Estimated Date of Delivery: 21.  She is doing well. She denies vaginal bleeding or fluid leaking. Fetal movement is present. She is not having contractions.  Concerns: tired, couldn't sleep well during the past 2 nights.   OBJECTIVE:  Blood pressure 90/60, pulse (!) 111, temperature 98  F (36.7  C), temperature source Oral, resp. rate 20, height 5' (1.524 m), weight 136 lb 12 oz (62 kg), last menstrual period 2020, SpO2 97 %, not currently breastfeeding.  Comfortable, no acute distress.  See OB Vitals flowsheet.  ASSESSMENT/PLAN:  Christiano was seen today for routine prenatal visit.    Diagnoses and all orders for this visit:    Encounter for supervision of other normal pregnancy in third trimester  -     Group B Strep Screen by PCR    Asymmetric IUGR affecting pregnancy, antepartum: BPD and HC were 2nd %ile. Will recheck now.   -     Ambulatory referral to Obstetrics / Gynecology    Primary insomnia  -     hydrOXYzine HCL (ATARAX) 25 MG tablet; Take 1-2 tablets (25-50 mg total) by mouth 3 (three) times a day as needed for itching.      -IUP at 36w0d:     Prenatal labs reviewed and Normal.      Fetal survey normal except now HC and BPD are 2nd %ile.     GBS swab today.     Peripartum Anesthesia: the patient does not desire an epidural during labor.     Post-partum Contraception: Depo     Breast/Bottle: breast     Reviewed plans for getting to the hospital.    RTC in 2 weeks or sooner with problems.    Visit completed along with assistance of Steffany .    Erica Umaña MD

## 2021-06-15 NOTE — PROGRESS NOTES
SUBJECTIVE:  Christiano Renae is a 25 y.o. female  at 37w3d by 12 week ultrasound. Estimated Date of Delivery: 21.  She is doing well. She denies vaginal bleeding or fluid leaking. Fetal movement is present. She is not having contractions.  Concerns: wants to discuss induction for IUGR.   OBJECTIVE:  Blood pressure 100/68, pulse (!) 103, temperature 97.9  F (36.6  C), temperature source Oral, resp. rate 20, height 5' (1.524 m), weight 136 lb 8 oz (61.9 kg), last menstrual period 2020, SpO2 98 %, not currently breastfeeding.  Comfortable, no acute distress.  See OB Vitals flowsheet.  ASSESSMENT/PLAN:  Christiano was seen today for routine prenatal visit.    Diagnoses and all orders for this visit:    Poor fetal growth affecting management of mother in third trimester, single or unspecified fetus: I discussed that with her degree of IUGR (per OB/GYN Dr. Jones I spoke with today, BPD, HC and AC were in 2nd %ile, I recommend delivery now. She agrees- scheduled for 3/17. .   -     Asymptomatic COVID-19 Virus (CORONAVIRUS) PCR; Future      -IUP at 37w3d:     Prenatal labs reviewed and Normal.     Fetal survey was done at 20 weeks and was normal, though today, growth ultrasound showed BPD, HC and AC in 2nd percentile.     GBS status is negative.     Peripartum Anesthesia: the patient does not desire an epidural during labor.     Post-partum Contraception: Depo     Breast/Bottle: breast     Reviewed plans for getting to the hospital.    RTC in 2 days for induction.    Visit completed along with assistance of Steffany .    Erica Umaña MD

## 2021-06-16 PROBLEM — O36.5930 POOR FETAL GROWTH AFFECTING MANAGEMENT OF MOTHER IN THIRD TRIMESTER: Status: ACTIVE | Noted: 2021-03-15

## 2021-06-16 PROBLEM — Z34.90 PREGNANT: Status: ACTIVE | Noted: 2021-03-31

## 2021-06-16 NOTE — PROGRESS NOTES
SUBJECTIVE:  Christiano Renae is a 25 y.o. female  at 38w0d by 12 week ultrasound. Estimated Date of Delivery: 21.  She is doing well. She denies vaginal bleeding or fluid leaking. Fetal movement is present. She is not having contractions.  Concerns: none. Had growth ultrasound and NST with [facility] yesterday, which showed baby is in the 5th percentile but dopplers were fine and NST was reactive.   OBJECTIVE:  Blood pressure 100/60, pulse 84, temperature 98.2  F (36.8  C), temperature source Oral, resp. rate 20, height 5' (1.524 m), weight 135 lb 12 oz (61.6 kg), last menstrual period 2020, not currently breastfeeding.  Comfortable, no acute distress.  See OB Vitals flowsheet.  ASSESSMENT/PLAN:  Christiano was seen today for routine prenatal visit.    Diagnoses and all orders for this visit:    Encounter for supervision of other normal pregnancy in third trimester    Poor fetal growth affecting management of mother in third trimester, single or unspecified fetus: as above, NST was reactive yesterday and dopplers were normal. Per recommendation from MF, induce by 39w6d. Both of her other babies came before that gestational age and she thinks she will go into labor within the next week.     Has follow up with [facility] on 3/26. Will see me on 3/24 and do NST.       -IUP at 38w0d:     Prenatal labs reviewed and Normal.     GBS status is negative.     Peripartum Anesthesia: the patient does not desire an epidural during labor.     Post-partum Contraception: depo     Breast/Bottle: breast     Reviewed plans for getting to the hospital.    Visit completed along with assistance of Steffany .    Erica Umaña MD

## 2021-06-16 NOTE — PROGRESS NOTES
"Please see \"Imaging\" tab under \"Chart Review\" for details of today's visit.    Brian Ramos        "

## 2021-06-16 NOTE — PROGRESS NOTES
"Please see \"Imaging\" tab under Chart Review for full report.  This ultrasound was performed in the [facility], and may be located under Care Everywhere.    Brenda Quarles MD  Maternal Fetal Medicine    "

## 2021-06-16 NOTE — TELEPHONE ENCOUNTER
"Called Christiano Pascual Renae because she didn't go to the hospital for her induction. She tells me via  that she wants to wait until she goes into labor naturally since \"my due date is soon anyway.\"    She does agree to get an ultrasound with [facility] in the meantime. Also has appointment with me on 3/19, at which time we will get an NST.     I will call [facility] today to try to get her in ASAP.     Erica Umaña MD    "

## 2021-06-16 NOTE — PROGRESS NOTES
SUBJECTIVE:  Christiano Renae is a 25 y.o. female  at 39w5d by 12 week ultrasound. Estimated Date of Delivery: 21.  She is doing well. She denies vaginal bleeding or fluid leaking. Fetal movement is present. She is having some cramping.  Concerns: the baby is moving less than normal and she feels concerned about that. She is having a little cramping but no strong contractions yet.   OBJECTIVE:  Blood pressure 108/66, pulse 88, temperature 98.2  F (36.8  C), temperature source Oral, resp. rate 16, height 5' (1.524 m), weight 137 lb (62.1 kg), last menstrual period 2020, SpO2 98 %, not currently breastfeeding.  Comfortable, no acute distress.  See OB Vitals flowsheet.  ASSESSMENT/PLAN:  Christiano was seen today for routine prenatal visit.    Diagnoses and all orders for this visit:    Supervision of other high risk pregnancies, third trimester    Poor fetal growth affecting management of mother in third trimester, single or unspecified fetus: EFW <10th %ile; she has been getting weekly BPPs with dopplers with MFM, last one was 8/10 on 3/26. Today's NST had one contraction with a late decel of 1 minute following it, and no accelerations for 20 minutes. I advised that due to this, we induce her now and she agrees. She will go home and get her kids settled, then come to [facility] with her .     Lindquist's score was 5; would start with pitocin unless she presents late in the evening, then could do cervical ripening overnight first.     Non Stress Test (NST)  Performed on 3/31/2021 at 39w5d.    Fetal Heart Rate Base Line: 155   Accelerations: none  Reactive: no  Decelerations: one late  Uterine Activity: one contraction preceding late decel  Interpretation: nonreactive, nonreassuring    Visit completed along with assistance of Steffany .    Erica Umaña MD

## 2021-06-16 NOTE — NURSING NOTE
NST performed due to fetal growth restriction.  Dr. Ramos reviewed efm tracing. See NST/BPP Doc Flowsheet tab.

## 2021-06-16 NOTE — PROGRESS NOTES
SUBJECTIVE:  Christiano Renae is a 25 y.o. female  at 38w5d by 12 week ultrasound. Estimated Date of Delivery: 21.  She is doing well. She denies vaginal bleeding or fluid leaking. Fetal movement is present. She is having some contractions.  Concerns: none  OBJECTIVE:  Blood pressure 94/60, pulse 91, temperature 98.5  F (36.9  C), temperature source Oral, resp. rate 20, height 5' (1.524 m), weight 134 lb 8 oz (61 kg), last menstrual period 2020, SpO2 98 %, not currently breastfeeding.  Comfortable, no acute distress.  See OB Vitals flowsheet.  ASSESSMENT/PLAN:  Christiano was seen today for routine prenatal visit.    Diagnoses and all orders for this visit:    Encounter for supervision of other normal pregnancy in third trimester    Poor fetal growth affecting management of mother in third trimester, single or unspecified fetus: had growth ultrasound showing EFW <10th %ile. Umbilical artery doppler was fine. Has another NST and doppler scheduled in 2 days. Induce by 40 weeks if not yet delivered- discussed this today and she agrees.       -IUP at 38w5d:     Prenatal labs reviewed and Normal.     Fetal survey was done at 20 weeks and was normal except small growth.     GBS status is negative.     Peripartum Anesthesia: the patient does not desire an epidural during labor.     Post-partum Contraception: Depo     Breast/Bottle: breast     Reviewed plans for getting to the hospital.    RTC in 1 week or sooner with problems.    Visit completed along with assistance of Steffany .    Erica Umaña MD

## 2021-06-16 NOTE — NURSING NOTE
39w0d here at [facility]. Placed on EFM at 0941, discontinued at 1028. Extended monitoring due to nonreactive FHR tracing. Maternal lateral positions implemented, oral hydration encouraged. Will plan for BPP. NST performed due to fetal growth restriction. Dr. Quarles reviewed efm tracing. See NST/BPP Doc Flowsheet tab.

## 2021-06-17 NOTE — PROGRESS NOTES
New OB Clinic Note - 5/3/2018   Visit was completed along with Steffany . Here today with son who just turned 1.   SUBJECTIVE:  Christiano Renae is a 22 y.o.  female @ 11w3d who is here for new OB visit.   LMP 2018, exact, gives EDC of 2018.  Current Concerns: tired. Does not have to wake up with her son in the middle of the night, so can sleep the night through but still feels tired.   Her nausea and vomiting have resolved.    She denies bleeding, cramping. No loss of fluid. She denies HA.   Denies constipation.  OB History:  Patient is . Prior Pregnancies:  OB History    Para Term  AB Living   2 1 1 0 0 1   SAB TAB Ectopic Multiple Live Births   0 0 0 0 1      # Outcome Date GA Lbr Adeel/2nd Weight Sex Delivery Anes PTL Lv   2 Current            1 Term 17 39w4d 09:55 / 00:42 7 lb 7 oz (3.374 kg) M Vag-Spont Local N MANNY      Birth Comments: none        She does not have a history of  birth before 37 weeks with a lennon gestation.  She does not have a history of preeclampsia in prior pregnancy.   She does not have a history of recurrent (>2) pregnancy losses.  She does not have a history of Down's syndrome, sickle cell anemia or other genetic disorder affecting a child in her family.  She does not have a history of major depression or postpartum depression.  She does not have a history of STI's including Chlamydia, gonorrhea, Hep B virus, syphilis, HPV, or genital herpes.   Social Hx:   She has support from her family and father of baby is involved.   She stays at home with her son, not working outside the home.  She has not used tobacco, has not used EtOH and has not used illicit drugs.  smokes outside.   Current Medications: prenatal vitamins, Vit B6, unisom  Past Medical History:   Diagnosis Date     Medical history non-contributory      Varicella     Immune per lab 16      Past Surgical History:   Procedure Laterality Date     NO PAST SURGERIES    "    Family History   Problem Relation Age of Onset     No Medical Problems Mother      No Medical Problems Father      No Medical Problems Sister      No Medical Problems Brother      No Medical Problems Son      Current Outpatient Prescriptions on File Prior to Visit   Medication Sig Dispense Refill     prenat.vits,sinan,min-iron-folic (PRENATAL VITAMIN) Tab Take 1 tablet by mouth daily. 100 each 3     pyridoxine, vitamin B6, (VITAMIN B-6) 50 MG tablet Take 1 tablet (50 mg total) by mouth 4 (four) times a day. 100 tablet 1     condoms - male Misc Use as needed 24 each 11     diphenhydrAMINE (BENADRYL) 50 MG capsule Take 1 capsule (50 mg total) by mouth at bedtime as needed (nausea). 60 capsule 1     docusate sodium (COLACE) 50 MG capsule Take 2 capsules (100 mg total) by mouth daily. 30 capsule 0     No current facility-administered medications on file prior to visit.      ?  OBJECTIVE:  Vitals:    05/03/18 0835   BP: 94/58   Pulse: 80   Resp: 16   Temp: 98  F (36.7  C)   TempSrc: Oral   Weight: 105 lb 4 oz (47.7 kg)   Height: 5' 0.25\" (1.53 m)     Gen: Awake, alert, in no acute distress  HEENT: oral mucosa is moist and pink, dentition is adequate   Neck: Thyroid is non-enlarged, without nodules or tenderness  CV: RRR with normal S1 and S2. No murmurs appreciated.  Resp: Lungs are clear to auscultation bilaterally without crackles or wheezing.  Abd: non-tender, non-distended. Bowel sounds present.   Pelvic Exam: Vagina and vulva are normal; no discharge is noted. Cervix normal without lesions. Uterus anteverted and mobile, normal in size and shape without tenderness. Adnexa normal in size without masses or tenderness. She is not due for a pap smear today.  Lower extremities: No edema  Neuro: no focal deficits  FHT unable to be heard.  Results for orders placed or performed in visit on 04/16/18   Pregnancy, Urine   Result Value Ref Range    Pregnancy Test, Urine Positive (!) Negative     ASSESSMENT/PLAN:  Christiano Renae " is a 22 y.o.  at 11w3d weeks by 1st trimester u/s. Estimated Date of Delivery: 18.  1. Discussed recommended weight gain, healthy eating habits, exercise, common first trimester concerns (nausea, vomiting, constipation, fatigue, GERD, urinary frequency) and warning signs for miscarriage and  labor (bleeding, cramping).   2. Advised that her  ALWAYS smoke outside and also wash his hands and change clothes when he gets home, so as not to expose her or her son.   3. Pelvic exam including GC, Chlamydia was completed.  4. Prenatal labs completed - prenatal profile, UA/UC, HIV   5. Offered first trimester screening for aneuploidy (trisomy 21, 13, 18) with laboratory and nuchal translucency. Patient accepted this. Scheduled today, along with dating u/s. She stated LMP was exact, but I could not hear FHTs today, so suspect she may be earlier along.   6. Counseled on benefits of breastfeeding. Patient is planning to breastfeed. Is still breastfeeding son, at 1 year of age.   7. Discussed seatbelts, diet, exercise, caffeine intake, daily vitamins, child birth classes, choosing a baby doctor, and regular prenatal exams   Christiano was seen today for initial prenatal visit.    Diagnoses and all orders for this visit:    11 weeks gestation of pregnancy  -     Hepatitis B Surface antigen (HBsAG)  -     HIV Antigen/Antibody Screening [place]  -     HM1(CBC and Differential)  -     HML Antibody Screen  -     RPR  -     Rubella Immune Status (IgG)  -     Urinalysis Macroscopic  -     Culture, Urine  -     Chlamydia/gonorrhoeae CERVIX  -     HM1 (CBC with Diff)  -     Ambulatory referral to Obstetrics / Gynecology    Secondhand smoke exposure      ?  RTC in 4 weeks or sooner if problems. At next visit: needs drug screen-forgot to order today.   Erica Umaña MD  Options for treatment and follow-up care were reviewed with the patient and/or guardian. Christiano Garner Paw and/or guardian engaged in the decision making process  and verbalized understanding of the options discussed and agreed with the final plan.

## 2021-06-17 NOTE — PROGRESS NOTES
Met with patient at [facility] as patient had concerns about not having MA. Steffany  used via language line. Pt has active coverage with [employer].  She reports renewal paperwork was mailed to her last week, she completed it and dropped off at [facility] for processing. Pt also states she notified [facility] of current pregnancy. Discussed with patient that these were the needed steps to renew coverage and she has completed them all, we just need to wait on [facility] to process.  I will follow case at [facility] to see if anything else is needed, and inform patient of this.     Patient had many questions about her pregnancy, and I reassured that they would be answered at initial OB visit on 05/3/2018.

## 2021-06-17 NOTE — PROGRESS NOTES
"ROMEO Renae is a 22 y.o. female here for pregnancy confirmation. Feeling somewhat excited and somewhat stressed about being pregnant. She and her  were not trying to conceive but they are accepting of the pregnancy. He is the same father as her 1-year-old son. They are getting along and he is kind to her.  Last baby was born without complications at 39 weeks- delivered by  midwives.     LMP 2/12.  Having nausea and vomiting in the morning and then later in the day after eating- vomiting 3-4 times per day total.     No abd pain or bleeding.     She is not yet taking a prenatal vitamin.   Past Medical History:   Diagnosis Date     Medical history non-contributory      Current Outpatient Prescriptions on File Prior to Visit   Medication Sig Dispense Refill     condoms - male Misc Use as needed 24 each 11     docusate sodium (COLACE) 50 MG capsule Take 2 capsules (100 mg total) by mouth daily. 30 capsule 0     [DISCONTINUED] prenatal multivit-Ca-min-Fe-FA (PRENATAL VITAMIN) Tab Take 1 tablet by mouth daily. 90 each 3     [DISCONTINUED] sulfamethoxazole-trimethoprim (SEPTRA DS) 800-160 mg per tablet Bid 7days 14 tablet 0     No current facility-administered medications on file prior to visit.        Past medical and social history reviewed with no changes.   ?  ROS:   Psych: No significant change in mood, anxiety level   See HPI  ?  O  /70  Pulse 92  Temp 98.1  F (36.7  C) (Oral)   Resp 16  Ht 5' 0.25\" (1.53 m)  Wt 107 lb (48.5 kg)  LMP 02/12/2018 (Exact Date)  Breastfeeding? Yes  BMI 20.72 kg/m2   Vitals reviewed. Nursing note reviewed.  General Appearance: Pleasant and alert, in no acute distress  HEENT: mucous membranes moist,   Skin: warm, dry, intact, no rash noted  Neuro: no focal deficits, CNs II-XII normal.   A/P  Christiano was seen today for pregnancy confirmation.    Diagnoses and all orders for this visit:    Amenorrhea  -     Pregnancy, Urine- positive    9 weeks gestation of pregnancy  -    "  prenat.vits,sinan,min-iron-folic (PRENATAL VITAMIN) Tab; Take 1 tablet by mouth daily.    Nausea/vomiting in pregnancy: discussed lifestyle modifications as well- eating right away in the AM, small amounts of bland food. Will try benadryl/B6.   -     pyridoxine, vitamin B6, (VITAMIN B-6) 50 MG tablet; Take 1 tablet (50 mg total) by mouth 4 (four) times a day.  -     diphenhydrAMINE (BENADRYL) 50 MG capsule; Take 1 capsule (50 mg total) by mouth at bedtime as needed (nausea).     Return in 2 weeks for first OB visit.     Visit completed along with assistance of Steffany .  Options for treatment and follow-up care were reviewed with the patient and/or guardian. Christiano Garner Paw and/or guardian engaged in the decision making process and verbalized understanding of the options discussed and agreed with the final plan.    Erica Umaña MD

## 2021-06-17 NOTE — PROGRESS NOTES
Assessment/Plan:     Christiano was seen today for postpartum care and contraception.    Diagnoses and all orders for this visit:    Routine postpartum follow-up    Uses birth control  -     Pregnancy (Beta-hCG, Qual), Urine    Depo-Provera contraceptive status  -     medroxyPROGESTERone injection 150 mg (DEPO-PROVERA)    Constipation, unspecified constipation type  -     docusate sodium (COLACE) 100 MG capsule; Take 1 capsule (100 mg total) by mouth 2 (two) times a day as needed for constipation.    Low vitamin D level  -     cholecalciferol, vitamin D3, 125 mcg (5,000 unit) capsule; Take 1 capsule (5,000 Units total) by mouth daily.    Mother currently breast-feeding  -     -iron-folate-dha 90 mg iron- 1 mg-200 mg cap; Take 1 capsule by mouth daily.      Anemia:  Recommend continuing prenatal vitamin  Breastfeeding/Bottle feeding:  Pt is breasfeeding, discussed how to support  Contraception:   Depo-Provera injections  Depression:   Denies  Exercise:   Encouraged increasing exercise based on how she is feeling.  Healthcare maintenance:   Up to date  Immunizations:    Immunizations up to date           Subjective:      Christiano Renae is a 25 y.o. female who presents for a postpartum visit.   She is 6 weeks postpartum following a spontaneous vaginal delivery.     Postpartum course has been Unremarkable.  Baby's course has been Uncomplicated.  Periods:  None Patient's last menstrual period was 2020 (approximate).   Bowel or bladder concerns: none  Patient has not resumed intercourse.    [place]  Depression Scale EPDS Total Score: 0 (2021  5:48 PM)       Last hgb on file:    Lab Results   Component Value Date    HGB 12.1 2021        The following portions of the patient's history were reviewed and updated as appropriate: allergies, current medications and problem list     OB History    Para Term  AB Living   3 3 3 0 0 3   SAB TAB Ectopic Multiple Live Births   0 0 0 0 3      #  Outcome Date GA Lbr Adeel/2nd Weight Sex Delivery Anes PTL Lv   3 Term 21 39w5d 00:31 / 00:01 6 lb 12.6 oz (3.08 kg) F Vag-Spont None N MANNY   2 Term 18 39w0d  6 lb 13 oz (3.09 kg) F Vag-Spont IV N MANNY   1 Term 17 39w4d 09:55 / 00:42 7 lb 7 oz (3.374 kg) M Vag-Spont Local N MANNY      Birth Comments: none     Information for the patient's :  Nikole Hdez [[id number]]   Nikole Hdez       Objective:      Vitals:    21 1330   BP: 96/68   Pulse: 82   Temp: 98.6  F (37  C)   SpO2: 98%       Physical Exam:  General Appearance: Alert, cooperative, no distress, appears stated age      Erica Umaña MD

## 2021-06-18 NOTE — PROGRESS NOTES
Due date updated - there was a 10 day discrepency in dates on 11w5d ultrasound. Discussed with pt.  No quickening yet; discussed.  FHT's were hard to find today, but were ultimately definitely found.  Nausea resolved.  Was still trying to take banophen but it was making her too sleepy; advised to stop completely.  Neck tight/heavy; will rx Tylenol prn.  Checking utox today per standard screen.  Ordered FAS to be done at [facility] 20-22wks.  RTC 1 month.

## 2021-06-19 NOTE — PROGRESS NOTES
Ultrasound tomorrow (rescheduled twice because no  showed up)  Quickening in last couple of months.  Discussed warning s/sx.  Next visit: 1hr glucose, etc.

## 2021-06-20 NOTE — LETTER
Letter by Nia Roy MD at      Author: Nia Roy MD Service: -- Author Type: --    Filed:  Encounter Date: 9/8/2020 Status: (Other)         September 8, 2020     Patient: Christiano Renae   YOB: 1995   Date of Visit: 9/8/2020       To Whom it May Concern:    Christiano Renae was seen in my clinic on 9/8/2020.    If you have any questions or concerns, please don't hesitate to call.    Sincerely,         Electronically signed by Nia Roy MD

## 2021-06-20 NOTE — LETTER
Letter by Nia Roy MD at      Author: Nia Roy MD Service: -- Author Type: --    Filed:  Encounter Date: 9/8/2020 Status: (Other)         September 8, 2020     Patient: Christiano Renae   YOB: 1995   Date of Visit: 9/8/2020       To Whom It May Concern:    It is my medical opinion that Christiano Renae may return to full duty immediately with no restrictions.  She works at [employer].   She has a confirmed pregnancy with LMP 6/16/20 and ROB 3/23/2021.     If you have any questions or concerns, please don't hesitate to call.    Sincerely,        Electronically signed by Nia Roy MD

## 2021-06-20 NOTE — PROGRESS NOTES
Doing well.    She had ultrasound last month.  Normal except for echogenic cardiac focus.  Discussed this at length today and will check cell free DNA testing.  Ultrasound revealed is a girl!    Discussed breastfeeding (plans breast),  Labor.  Labs today:  Hgb, RPR, 1hr glucose, Progenity.  Flu shot given today  Tdap will be due next visit.

## 2021-06-20 NOTE — PROGRESS NOTES
"SUBJECTIVE:  Christiano Renae is a 22 y.o. female  at 31w1d by 11 week u/s. Estimated Date of Delivery: 18.  She is doing well. She denies vaginal bleeding or fluid leaking. Fetal movement is present. She is not having contractions.  Concerns: none  OBJECTIVE:  Blood pressure 94/58, pulse 88, temperature 98.1  F (36.7  C), temperature source Oral, resp. rate 16, height 5' 0.25\" (1.53 m), weight 130 lb 8 oz (59.2 kg), last menstrual period 2018, not currently breastfeeding.  Comfortable, no acute distress.  See OB Vitals flowsheet.  ASSESSMENT/PLAN:  Christiano was seen today for routine prenatal visit.    Diagnoses and all orders for this visit:    31 weeks gestation of pregnancy  -     Tdap vaccine,  8yo or older,  IM    General counseling and advice for contraceptive management      -IUP at 31w1d:   Prenatal labs reviewed and Normal.   1st trimester screen normal  Fetal survey was done at 20 weeks and was normal except for echogenic cardiac focus- Progenity normal.   Peripartum Anesthesia: not yet discussed  Post-partum Contraception: Depo. Did not want to discuss other options today. We discussed the benefits of child spacing.   Breast/Bottle: breast   Reviewed plans for getting to the hospital.  Discussed [facility]. She is not interested.  RTC in 3 weeks or sooner with problems.    Visit completed along with assistance of Steffany .    Erica Umaña MD    "

## 2021-06-21 NOTE — PROGRESS NOTES
"SUBJECTIVE:  Christiano Renae is a 22 y.o. female  at 34w1d by 11 week u/s. Estimated Date of Delivery: 18.  She is doing well. She denies vaginal bleeding or fluid leaking. Fetal movement is present. She is not having contractions.  Concerns: hard to lie down. She is lying on her side.   OBJECTIVE:  Blood pressure 102/72, pulse 88, temperature 98.2  F (36.8  C), temperature source Oral, resp. rate 16, height 5' 0.25\" (1.53 m), weight 132 lb 8 oz (60.1 kg), last menstrual period 2018, not currently breastfeeding.  Comfortable, no acute distress.  See OB Vitals flowsheet.  ASSESSMENT/PLAN:  Christiano was seen today for routine prenatal visit.    Diagnoses and all orders for this visit:    34 weeks gestation of pregnancy      -IUP at 34w1d:   Prenatal labs reviewed and Normal.   Progenity normal (done for echogenic focus)    Peripartum Anesthesia: the patient does not desire an epidural during labor.   Post-partum Contraception: Depo   Breast/Bottle: breast   Reviewed plans for getting to the hospital.  Discussed [facility]. She is not interested.  RTC in 2 weeks or sooner with problems.    Visit completed along with assistance of Steffany .    Erica Umaña MD    "

## 2021-06-21 NOTE — LETTER
Letter by Erica Umaña MD at      Author: Erica Umaña MD Service: -- Author Type: --    Filed:  Encounter Date: 10/15/2020 Status: (Other)         Christiano Garner Oc  [address]  [place] [address]             October 15, 2020         Dear Ms. Renae,    We are happy to inform you that your recent Pap smear is normal.    It is recommended that you have your next Pap smear in 3 years. You will also need to return to the clinic every year for an annual wellness visit.    If you have additional questions regarding this result, please contact our office and we will be happy to assist you.      Sincerely,    [facility] Care Team

## 2021-06-21 NOTE — PROGRESS NOTES
SUBJECTIVE:  Christiano Renae is a 23 y.o. female  at 38w1d by 11 week u/s. Estimated Date of Delivery: 18.  She is doing well. She denies vaginal bleeding or fluid leaking. Fetal movement is present. She is not having contractions.  Concerns: none  OBJECTIVE:  Blood pressure 98/60, pulse 84, temperature 97.9  F (36.6  C), temperature source Oral, resp. rate 12, height 5' (1.524 m), weight 136 lb 1.9 oz (61.7 kg), last menstrual period 2018, SpO2 98 %, not currently breastfeeding.  Comfortable, no acute distress.  See OB Vitals flowsheet.  ASSESSMENT/PLAN:  Christiano was seen today for routine prenatal visit.    Diagnoses and all orders for this visit:    38 weeks gestation of pregnancy      -IUP at 38w1d:   Prenatal labs reviewed and Normal.   Normal progenity (done for fetal echogenic cardiac focus).   GBS status is negative.   Peripartum Anesthesia: the patient does not desire an epidural during labor.   Post-partum Contraception: Depo   Breast/Bottle: breast   Reviewed plans for getting to the hospital.  RTC in 1 week or sooner with problems.    Visit completed along with assistance of Steffany .    Ercia Umaña MD

## 2021-06-21 NOTE — PROGRESS NOTES
"SUBJECTIVE:  Christiano Renae is a 23 y.o. female  at 37w0d by early ultrasound. Estimated Date of Delivery: 18.  She is doing well. She denies vaginal bleeding or fluid leaking. Fetal movement is present. She is not having contractions.  Concerns: none  OBJECTIVE:  Blood pressure 90/64, pulse 88, temperature 98.7  F (37.1  C), temperature source Oral, resp. rate 16, height 5' 0.25\" (1.53 m), weight 136 lb 8 oz (61.9 kg), last menstrual period 2018, not currently breastfeeding.  Comfortable, no acute distress.  See OB Vitals flowsheet.  ASSESSMENT/PLAN:  Christiano was seen today for routine prenatal visit.    Diagnoses and all orders for this visit:    37 weeks gestation of pregnancy  -     Group B Strep Screen by PCR    -IUP at 37w0d:   Prenatal labs reviewed and Normal.   Normal 1st trimester screen, normal progenity (done for echogenic cardiac focus)   GBS swab done today.   Peripartum Anesthesia: the patient does not desire an epidural during labor.   Post-partum Contraception: Depo   Breast/Bottle: breast   Reviewed plans for getting to the hospital-  will drive, or if he's a work, she might take ambulance. Gave info sheet stating she should be brought to [facility] in 1 week or sooner with problems.    Visit completed along with assistance of Steffany .    Erica Umaña MD    "

## 2021-06-23 NOTE — PROGRESS NOTES
Assessment/Plan:     Christiano was seen today for postpartum care and contraception.    Diagnoses and all orders for this visit:    Routine postpartum follow-up    Contraception  -     Pregnancy, Urine- neg    Depo-Provera contraceptive status  -     medroxyPROGESTERone injection 150 mg (DEPO-PROVERA); Inject 1 mL (150 mg total) into the shoulder, thigh, or buttocks every 3 (three) months.    Rash and nonspecific skin eruption: She does not have a rash today but her description of an occasional itchy rash on her arms sounds like a mild dermatitis.  We will try hydrocortisone cream.  -     hydrocortisone 0.5 % cream; Apply to rash as needed.    Chronic bilateral low back pain without sciatica: No sciatica or red flags.  Back pain is likely due to her postpartum state, especially with having 2 pregnancies in close succession.  Explained this and provided a handout on back stretches and exercises.  Offered physical therapy but she would like to try exercising on her own at first.  I helped to demonstrate some of these exercises to her.        Anemia:  Normal antepartum hemoglobin with no excessive blood loss  Breastfeeding/Bottle feeding:  Pt is breasfeeding, discussed how to support  Contraception:   Depo-Provera injections  Depression:   Denies  Exercise:   Encouraged increasing exercise based on how she is feeling.  Healthcare maintenance:   Up to date  Immunizations:    Immunizations up to date               Subjective:      Christiano Renae is a 23 y.o. female who presents for a postpartum visit.   She is 6 weeks postpartum following a spontaneous vaginal delivery.     Postpartum course has been Unremarkable.  Baby's course has been Uncomplicated.  Periods:  None Patient's last menstrual period was 2018 (exact date).   Bowel or bladder concerns: none  Patient has not resumed intercourse.    Moro  Depression Scale EPDS Total Score: 0 (1/10/2019  8:33 AM)       Last hgb on file:    Lab Results   Component  Value Date    HGB 13.0 2018        The following portions of the patient's history were reviewed and updated as appropriate: allergies, current medications and problem list     OB History    Para Term  AB Living   2 2 2 0 0 2   SAB TAB Ectopic Multiple Live Births   0 0 0 0 2      # Outcome Date GA Lbr Adeel/2nd Weight Sex Delivery Anes PTL Lv   2 Term 18 39w0d  6 lb 13 oz (3.09 kg) F Vag-Spont IV N MANNY   1 Term 17 39w4d 09:55 / 00:42 7 lb 7 oz (3.374 kg) M Vag-Spont Local N MANNY      Birth Comments: none        Information for the patient's :  Maryann Holder [[id number]]   Maryann Holder      Objective:      Vitals:    01/10/19 0751   BP: 96/66   Pulse: 82   Resp: 16   Temp: 97.9  F (36.6  C)   SpO2: 98%       Physical Exam:  General Appearance: Alert, cooperative, no distress, appears stated age  Musculoskeletal: Straight leg raise test negative. No tenderness to palpation of lower back. Able to get up and down off exam table easily. No diastasis recti present.   Skin: Skin color, texture, turgor normal, no rashes or lesions    Erica Umaña MD

## 2021-07-09 ENCOUNTER — TRANSCRIBE ORDERS (OUTPATIENT)
Dept: FAMILY MEDICINE | Facility: CLINIC | Age: 26
End: 2021-07-09

## 2021-07-09 DIAGNOSIS — Z30.42 DEPO-PROVERA CONTRACEPTIVE STATUS: Primary | ICD-10-CM

## 2021-07-09 RX ORDER — MEDROXYPROGESTERONE ACETATE 150 MG/ML
150 INJECTION, SUSPENSION INTRAMUSCULAR
Status: COMPLETED | OUTPATIENT
Start: 2021-05-15 | End: 2022-04-12

## 2021-07-09 NOTE — PROGRESS NOTES
As part of the required manual data conversion process for integration, this encounter was created to document a CAM (Clinic Administered Medication) order. This information was copied from the [facility] patient's chart to the [facility] patient chart.     Luis Fernando Spicer PharmD  July 9, 2021

## 2021-07-14 PROBLEM — Z30.42 DEPO-PROVERA CONTRACEPTIVE STATUS: Status: RESOLVED | Noted: 2019-06-04 | Resolved: 2020-09-08

## 2021-07-14 PROBLEM — Z34.00 SUPERVISION OF NORMAL FIRST PREGNANCY: Status: RESOLVED | Noted: 2017-01-09 | Resolved: 2017-04-22

## 2021-07-14 PROBLEM — O36.5990 ASYMMETRIC IUGR AFFECTING PREGNANCY, ANTEPARTUM: Status: RESOLVED | Noted: 2021-03-05 | Resolved: 2021-03-15

## 2021-07-14 PROBLEM — Z34.90 PREGNANCY: Status: RESOLVED | Noted: 2018-08-29 | Resolved: 2019-06-04

## 2021-07-14 PROBLEM — Z28.39 RUBELLA NONIMMUNE STATUS, DELIVERED, CURRENT HOSPITALIZATION: Status: RESOLVED | Noted: 2017-04-24 | Resolved: 2017-06-05

## 2021-07-14 PROBLEM — Z37.9 NORMAL LABOR: Status: RESOLVED | Noted: 2017-04-22 | Resolved: 2017-04-22

## 2021-07-14 PROBLEM — Z34.90 PREGNANT: Status: RESOLVED | Noted: 2017-04-22 | Resolved: 2017-04-22

## 2021-07-30 DIAGNOSIS — K59.00 CONSTIPATION, UNSPECIFIED CONSTIPATION TYPE: ICD-10-CM

## 2021-07-30 RX ORDER — DOCONEXENT, NIACINAMIDE, .ALPHA.-TOCOPHEROL ACETATE, DL-, CHOLECALCIFEROL, BETA CAROTENE, ASCORBIC ACID, THIAMINE MONONITRATE, RIBOFLAVIN, PYRIDOXINE HYDROCHLORIDE, CYANOCOBALAMIN, IRON, ZINC OXIDE, CUPRIC OXIDE, POTASSIUM IODIDE, MAGNESIUM OXIDE, FOLIC ACID, AND LEVOMEFOLATE CALCIUM 200; 15; 20; 1000; 1100; 60; 1.6; 1.8; 2.5; 25; 90; 25; 2; 150; 20; 1; .6 MG/1; MG/1; [IU]/1; [IU]/1; [IU]/1; MG/1; MG/1; MG/1; MG/1; UG/1; MG/1; MG/1; MG/1; UG/1; MG/1; MG/1; MG/1
1 CAPSULE, LIQUID FILLED ORAL
COMMUNITY
Start: 2021-05-14 | End: 2021-07-30

## 2021-08-03 RX ORDER — DOCUSATE SODIUM 100 MG/1
100 CAPSULE, LIQUID FILLED ORAL 2 TIMES DAILY PRN
Qty: 120 CAPSULE | Refills: 3 | Status: SHIPPED | OUTPATIENT
Start: 2021-08-03 | End: 2023-05-15

## 2021-08-03 RX ORDER — DOCONEXENT, NIACINAMIDE, .ALPHA.-TOCOPHEROL ACETATE, DL-, CHOLECALCIFEROL, BETA CAROTENE, ASCORBIC ACID, THIAMINE MONONITRATE, RIBOFLAVIN, PYRIDOXINE HYDROCHLORIDE, CYANOCOBALAMIN, IRON, ZINC OXIDE, CUPRIC OXIDE, POTASSIUM IODIDE, MAGNESIUM OXIDE, FOLIC ACID, AND LEVOMEFOLATE CALCIUM 200; 15; 20; 1000; 1100; 60; 1.6; 1.8; 2.5; 25; 90; 25; 2; 150; 20; 1; .6 MG/1; MG/1; [IU]/1; [IU]/1; [IU]/1; MG/1; MG/1; MG/1; MG/1; UG/1; MG/1; MG/1; MG/1; UG/1; MG/1; MG/1; MG/1
1 CAPSULE, LIQUID FILLED ORAL DAILY
Qty: 90 CAPSULE | Refills: 3 | Status: SHIPPED | OUTPATIENT
Start: 2021-08-03 | End: 2023-05-15

## 2021-08-04 ENCOUNTER — ALLIED HEALTH/NURSE VISIT (OUTPATIENT)
Dept: FAMILY MEDICINE | Facility: CLINIC | Age: 26
End: 2021-08-04
Payer: COMMERCIAL

## 2021-08-04 DIAGNOSIS — Z30.42 DEPO-PROVERA CONTRACEPTIVE STATUS: Primary | ICD-10-CM

## 2021-08-04 PROCEDURE — 96372 THER/PROPH/DIAG INJ SC/IM: CPT | Performed by: FAMILY MEDICINE

## 2021-08-04 PROCEDURE — 99207 PR NO CHARGE NURSE ONLY: CPT

## 2021-08-04 RX ADMIN — MEDROXYPROGESTERONE ACETATE 150 MG: 150 INJECTION, SUSPENSION INTRAMUSCULAR at 11:36

## 2021-10-06 ENCOUNTER — OFFICE VISIT (OUTPATIENT)
Dept: FAMILY MEDICINE | Facility: CLINIC | Age: 26
End: 2021-10-06
Payer: COMMERCIAL

## 2021-10-06 VITALS
OXYGEN SATURATION: 100 % | HEIGHT: 60 IN | TEMPERATURE: 98.4 F | WEIGHT: 119.5 LBS | HEART RATE: 98 BPM | BODY MASS INDEX: 23.46 KG/M2 | SYSTOLIC BLOOD PRESSURE: 96 MMHG | DIASTOLIC BLOOD PRESSURE: 68 MMHG

## 2021-10-06 DIAGNOSIS — M79.18 PAIN OF RIGHT DELTOID: Primary | ICD-10-CM

## 2021-10-06 DIAGNOSIS — R52 PAIN: ICD-10-CM

## 2021-10-06 DIAGNOSIS — Z23 NEED FOR VACCINATION: ICD-10-CM

## 2021-10-06 PROBLEM — Z34.90 PREGNANT: Status: RESOLVED | Noted: 2021-03-31 | Resolved: 2021-10-06

## 2021-10-06 PROCEDURE — 90686 IIV4 VACC NO PRSV 0.5 ML IM: CPT | Performed by: FAMILY MEDICINE

## 2021-10-06 PROCEDURE — 99214 OFFICE O/P EST MOD 30 MIN: CPT | Mod: 25 | Performed by: FAMILY MEDICINE

## 2021-10-06 PROCEDURE — 90471 IMMUNIZATION ADMIN: CPT | Performed by: FAMILY MEDICINE

## 2021-10-06 RX ORDER — ACETAMINOPHEN 325 MG/1
650 TABLET ORAL EVERY 6 HOURS PRN
Qty: 100 TABLET | Refills: 11 | Status: SHIPPED | OUTPATIENT
Start: 2021-10-06 | End: 2023-05-15

## 2021-10-06 ASSESSMENT — MIFFLIN-ST. JEOR: SCORE: 1196.06

## 2021-10-16 NOTE — PROGRESS NOTES
Assessment & Plan     Pain of right deltoid    No evidence of paralysis.  It is possible that the vaccine needle hit a nerve, which persists with pain.    No evidence of abscess.    Recheck if worsening.    Need for vaccination    - INFLUENZA VACCINE IM >6 MO VALENT IIV4 (ALFURIA/FLUZONE)    Pain    - acetaminophen (TYLENOL) 325 MG tablet  Dispense: 100 tablet; Refill: 11        30 minutes spent on the date of the encounter doing chart review and patient visit regarding pain of deltoid.           Return in about 1 year (around 10/6/2022) for Routine preventive.    Roge Painting MD, MD  [employer] DEBBI Alvarado is a 25 year old who presents for the following health issues     HPI     Right deltoid pain since Covid shot 5/27/21 at [facility].  Pain occurred immediately.  After that she had Depo Provera shot on right arm, which did not change the pain.  She has pain when carrying something, stirring food, or lying on that shoulder.  She is concerned that the pain could lead to paralysis.  She has not had any decrease in movement of the arm, nor decrease in strength.    Current Outpatient Medications   Medication Sig Dispense Refill     acetaminophen (TYLENOL) 325 MG tablet Take 2 tablets (650 mg) by mouth every 6 hours as needed for mild pain 100 tablet 11     cholecalciferol, vitamin D3, 125 mcg (5,000 unit) capsule [CHOLECALCIFEROL, VITAMIN D3, 125 MCG (5,000 UNIT) CAPSULE] Take 1 capsule (5,000 Units total) by mouth daily. 90 capsule 3     docusate sodium (COLACE) 100 MG capsule Take 1 capsule (100 mg) by mouth 2 times daily as needed 120 capsule 3     hydrocortisone 0.5 % cream [HYDROCORTISONE 0.5 % CREAM] Apply to rash as needed. 30 g 1     ibuprofen (ADVIL,MOTRIN) 800 MG tablet [IBUPROFEN (ADVIL,MOTRIN) 800 MG TABLET] Take 1 tablet (800 mg total) by mouth every 8 (eight) hours as needed for pain. 20 tablet 0     MEDICATION CANNOT BE REORDERED - PLEASE MANUALLY REORDER AND  "DISCONTINUE THE OLD ORDER [-IRON-FOLATE-DHA 90 MG IRON- 1 MG-200 MG CAP] Take 1 capsule by mouth daily. 90 capsule 3     Prenat-Fe Poly-Methfol-FA-DHA (VITAFOL FE+) 90-0.6-0.4-200 MG CAPS Take 1 capsule by mouth daily 90 capsule 3         Review of Systems   No fever or cough.      Objective    BP 96/68 (BP Location: Left arm, Patient Position: Sitting, Cuff Size: Adult Small)   Pulse 98   Temp 98.4  F (36.9  C) (Oral)   Ht 1.52 m (4' 11.84\")   Wt 54.2 kg (119 lb 8 oz)   LMP  (LMP Unknown)   SpO2 100%   BMI 23.46 kg/m    Body mass index is 23.46 kg/m .  Physical Exam   Heart normal  Lungs normal  Right shoulder: FROM, normal strength and sensation.  Lateral deltoid tender.            "

## 2021-10-20 ENCOUNTER — ALLIED HEALTH/NURSE VISIT (OUTPATIENT)
Dept: FAMILY MEDICINE | Facility: CLINIC | Age: 26
End: 2021-10-20
Payer: COMMERCIAL

## 2021-10-20 DIAGNOSIS — Z30.9 ENCOUNTER FOR CONTRACEPTIVE MANAGEMENT: Primary | ICD-10-CM

## 2021-10-20 PROCEDURE — 99207 PR NO CHARGE NURSE ONLY: CPT

## 2021-10-20 PROCEDURE — 96372 THER/PROPH/DIAG INJ SC/IM: CPT | Performed by: FAMILY MEDICINE

## 2021-10-20 RX ADMIN — MEDROXYPROGESTERONE ACETATE 150 MG: 150 INJECTION, SUSPENSION INTRAMUSCULAR at 10:38

## 2022-01-12 ENCOUNTER — ALLIED HEALTH/NURSE VISIT (OUTPATIENT)
Dept: FAMILY MEDICINE | Facility: CLINIC | Age: 27
End: 2022-01-12
Payer: COMMERCIAL

## 2022-01-12 DIAGNOSIS — Z23 NEED FOR VACCINATION: Primary | ICD-10-CM

## 2022-01-12 PROCEDURE — 96372 THER/PROPH/DIAG INJ SC/IM: CPT | Performed by: PHARMACIST

## 2022-01-12 PROCEDURE — 99207 PR NO CHARGE NURSE ONLY: CPT

## 2022-01-12 RX ADMIN — MEDROXYPROGESTERONE ACETATE 150 MG: 150 INJECTION, SUSPENSION INTRAMUSCULAR at 13:11

## 2022-04-12 ENCOUNTER — ALLIED HEALTH/NURSE VISIT (OUTPATIENT)
Dept: FAMILY MEDICINE | Facility: CLINIC | Age: 27
End: 2022-04-12
Payer: COMMERCIAL

## 2022-04-12 DIAGNOSIS — Z30.9 ENCOUNTER FOR CONTRACEPTIVE MANAGEMENT: Primary | ICD-10-CM

## 2022-04-12 PROCEDURE — 99207 PR NO CHARGE NURSE ONLY: CPT

## 2022-04-12 PROCEDURE — 96372 THER/PROPH/DIAG INJ SC/IM: CPT | Performed by: FAMILY MEDICINE

## 2022-04-12 RX ADMIN — MEDROXYPROGESTERONE ACETATE 150 MG: 150 INJECTION, SUSPENSION INTRAMUSCULAR at 11:23

## 2022-06-20 DIAGNOSIS — Z30.45 CONTRACEPTIVE PATCH STATUS: Primary | ICD-10-CM

## 2022-06-20 RX ORDER — NORELGESTROMIN AND ETHINYL ESTRADIOL 35; 150 UG/MG; UG/MG
PATCH TRANSDERMAL
Qty: 12 PATCH | Refills: 4 | Status: SHIPPED | OUTPATIENT
Start: 2022-06-20 | End: 2023-05-15

## 2022-06-20 NOTE — PROGRESS NOTES
Christiano Renae came in for her daughter's WCC and requested to have the ortho evra patch prescribed. I explained how this works and prescribed 12 patches with 4 refills.     Erica Umaña MD

## 2022-10-04 PROBLEM — O35.8XX0 MATERNAL CARE FOR OTHER (SUSPECTED) FETAL ABNORMALITY AND DAMAGE, NOT APPLICABLE OR UNSPECIFIED: Status: RESOLVED | Noted: 2018-08-29 | Resolved: 2020-09-08

## 2023-05-15 ENCOUNTER — OFFICE VISIT (OUTPATIENT)
Dept: FAMILY MEDICINE | Facility: CLINIC | Age: 28
End: 2023-05-15
Payer: COMMERCIAL

## 2023-05-15 VITALS
TEMPERATURE: 98.3 F | WEIGHT: 120 LBS | RESPIRATION RATE: 16 BRPM | HEIGHT: 60 IN | SYSTOLIC BLOOD PRESSURE: 101 MMHG | BODY MASS INDEX: 23.56 KG/M2 | HEART RATE: 98 BPM | DIASTOLIC BLOOD PRESSURE: 70 MMHG | OXYGEN SATURATION: 99 %

## 2023-05-15 DIAGNOSIS — R63.0 DECREASE IN APPETITE: ICD-10-CM

## 2023-05-15 DIAGNOSIS — Z78.9 NOT PROFICIENT IN ENGLISH LANGUAGE: ICD-10-CM

## 2023-05-15 DIAGNOSIS — R11.0 NAUSEA: ICD-10-CM

## 2023-05-15 DIAGNOSIS — R53.83 FATIGUE, UNSPECIFIED TYPE: ICD-10-CM

## 2023-05-15 DIAGNOSIS — N92.6 MISSED PERIOD: ICD-10-CM

## 2023-05-15 DIAGNOSIS — Z32.01 POSITIVE PREGNANCY TEST: Primary | ICD-10-CM

## 2023-05-15 PROBLEM — O36.5930 POOR FETAL GROWTH AFFECTING MANAGEMENT OF MOTHER IN THIRD TRIMESTER: Status: RESOLVED | Noted: 2021-03-15 | Resolved: 2023-05-15

## 2023-05-15 LAB — HCG UR QL: POSITIVE

## 2023-05-15 PROCEDURE — 81025 URINE PREGNANCY TEST: CPT | Performed by: FAMILY MEDICINE

## 2023-05-15 PROCEDURE — 99213 OFFICE O/P EST LOW 20 MIN: CPT | Performed by: FAMILY MEDICINE

## 2023-05-15 RX ORDER — DOCONEXENT, NIACINAMIDE, .ALPHA.-TOCOPHEROL ACETATE, DL-, CHOLECALCIFEROL, BETA CAROTENE, ASCORBIC ACID, THIAMINE MONONITRATE, RIBOFLAVIN, PYRIDOXINE HYDROCHLORIDE, CYANOCOBALAMIN, IRON, ZINC OXIDE, CUPRIC OXIDE, POTASSIUM IODIDE, MAGNESIUM OXIDE, FOLIC ACID, AND LEVOMEFOLATE CALCIUM 200; 15; 20; 1000; 1100; 60; 1.6; 1.8; 2.5; 25; 90; 25; 2; 150; 20; 1; .6 MG/1; MG/1; [IU]/1; [IU]/1; [IU]/1; MG/1; MG/1; MG/1; MG/1; UG/1; MG/1; MG/1; MG/1; UG/1; MG/1; MG/1; MG/1
1 CAPSULE, LIQUID FILLED ORAL DAILY
Qty: 90 CAPSULE | Refills: 3 | Status: SHIPPED | OUTPATIENT
Start: 2023-05-15 | End: 2023-05-26

## 2023-05-15 RX ORDER — ONDANSETRON 4 MG/1
4 TABLET, ORALLY DISINTEGRATING ORAL EVERY 8 HOURS PRN
Qty: 30 TABLET | Refills: 3 | Status: SHIPPED | OUTPATIENT
Start: 2023-05-15 | End: 2023-05-26

## 2023-05-15 NOTE — PATIENT INSTRUCTIONS
-Thank you for choosing the [facility].  -It was a pleasure to see you today.  -Please take a look at the information below for more specific details regarding the treatment plan and recommendations.  -In this after visit summary is a list of your medications and specific instructions.  Please review this carefully as there may be changes made to your medication list.  -If there are any particular questions or concerns, please feel free to reach out to Dr. Blanco.  -If any labs have been completed, we will reach out to you about results.  If the results are normal or not concerning, a letter or [employer] message will be sent to you.  If any follow-up is needed, either Dr. Blanco or the nurse will give you a call.  If you have not heard regarding results after 2 weeks, please reach out to the clinic.    Patient Instructions:    -You are pregnant.  -Start taking prenatal vitamins once daily as ordered.  -Do not take any medications unless prescribed by your doctor.  -Avoid any use of alcohol, tobacco/cigarettes, or any recreational drugs as these can cause harm to the unborn child.  -Try to follow a balanced diet with lots of fruits and vegetables.  -Be sure to stay well-hydrated.    -Make an appointment to see the OB doctor for your first OB visit.       Please seek immediate medical attention (go to the emergency room or urgent care) for the following reasons: worsening symptoms, abdominal cramping, vaginal bleeding, fainting, or any concerning changes.    Please return to clinic in 1-2 weeks for your first OB visit, or sooner as needed.      --------------------------------------------------------------------------------------------------------------------    -We are always looking for ways to improve.  You may be selected to receive a survey regarding your visit today.  We encourage you to complete the survey and provide specific, constructive feedback to help us improve our processes.  Thank you  for your time!  -Please review the contact information listed on the after visit summary and in the electronic chart.  Below is the phone number that we have on file.  If there are any changes that are needed to be made, please reach out to the clinic.  [phone] (home)

## 2023-05-15 NOTE — PROGRESS NOTES
OFFICE VISIT    Assessment/Plan:     Patient Instructions:    -You are pregnant.  -Start taking prenatal vitamins once daily as ordered.  -Do not take any medications unless prescribed by your doctor.  -Avoid any use of alcohol, tobacco/cigarettes, or any recreational drugs as these can cause harm to the unborn child.  -Try to follow a balanced diet with lots of fruits and vegetables.  -Be sure to stay well-hydrated.    -Make an appointment to see the OB doctor for your first OB visit.       Please seek immediate medical attention (go to the emergency room or urgent care) for the following reasons: worsening symptoms, abdominal cramping, vaginal bleeding, fainting, or any concerning changes.    Please return to clinic in 1-2 weeks for your first OB visit, or sooner as needed.      Mu was seen today for pregnancy test.  Diagnoses and all orders for this visit:    Positive pregnancy test  Nausea  Decrease in appetite  Fatigue, unspecified type  Missed period  Not proficient in English language: Patient is noted to be about 8 weeks pregnant at this time.  Plan for medications as below.  Patient will make an appointment for her initial OB visit.  Patient has some forms that she needs assistance with, so care coordination referral placed.  -     HCG qualitative urine  -     ondansetron (ZOFRAN ODT) 4 MG ODT tab; Take 1 tablet (4 mg) by mouth every 8 hours as needed for nausea  -     doxylamine (UNISOM) 25 MG TABS tablet; Take 1 tablet (25 mg) by mouth nightly as needed (nausea, sleep)  -     Primary Care - Care Coordination Referral; Future      The diagnoses, treatment options, risk, benefits, and recommendations were reviewed with patient/guardian.  Questions were answered to patient's/guardian satisfaction.  Red flag signs were reviewed.  Patient/guardian is in agreement with above plan.      Subjective: 27 year old female who is previously healthy who presents to clinic for the following complaints:   Patient  "presents with:  Pregnancy Test: Went off the patch in 11/2022     Answers for HPI/ROS submitted by the patient on 5/15/2023  What is the reason for your visit today?: Pregnancy confirmation  When did your symptoms begin?: More than a month    Patient is uncertain of when her last period was. Patient's menstrual periods are regular.  When taking the patch, her periods were regular (monthly).  Patient thinks she may be pregnant.  In March, that was when she started having the symptoms below.  Patient has been noticing dizziness, nausea, reduced appetite. She got really dizzy once where she felt like she was going to faint and she did fall down. This happened 1-2 time about two weeks ago. She has been okay since that time, though. She has been taking prenatal vitamins and that seems to be helping her feel better. She hasn't been eating normally.   Patient denies any abdominal cramping, vaginal bleeding, vomiting, fatigue, fevers, chills.  She otherwise feels at baseline.    Obstetrics history:   G 3 P 3003.     She reports that she is currently taking prenatal vitamins.    Patient has forms for her daughter, Maryann Holder. The forms are for dental visit information and screening questions for school.    UPT test was positive today.  Reviewed with patient.    A professional [employer] telephone  was utilized for the office visit.     The 10 point review of system is negative except as stated in the HPI.    Allergies were reviewed and updated.    Objective:   /70   Pulse 98   Temp 98.3  F (36.8  C) (Oral)   Resp 16   Ht 1.52 m (4' 11.84\")   Wt 54.4 kg (120 lb)   LMP  (LMP Unknown)   SpO2 99%   BMI 23.56 kg/m    General: Active, alert, nontoxic-appearing.  No acute distress.  HEENT: Normocephalic, atraumatic.  Pupils are equal and round.  Sclera is clear.  Normal external ears. Nares patent.  Moist mucous membranes.    Cardiac: RRR.  S1, S2 present.  No murmurs, rubs, or " gallops.  Respiratory/chest: Clear to auscultation bilaterally.  No wheezes, rales, rhonchi.  Breathing is not labored.  No accessory muscle usage.  Abdomen: Soft, nondistended, nontender.  No masses or organomegaly noted.  No guarding or rebound tenderness appreciated.  Extremities: Voluntary movements intact.  Integumentary: No concerning rash or skin changes appreciated.        Jay Blanco MD  [facility] [place] [address]  Phone: [phone]  Fax: [phone]    5/16/2023  5:54 PM            Current Outpatient Medications   Medication     doxylamine (UNISOM) 25 MG TABS tablet     ondansetron (ZOFRAN ODT) 4 MG ODT tab     Prenat-Fe Poly-Methfol-FA-DHA (VITAFOL FE+) 90-0.6-0.4-200 MG CAPS     No current facility-administered medications for this visit.       No Known Allergies    Patient Active Problem List    Diagnosis Date Noted     Constipation 01/09/2017     Priority: Medium       Family History   Problem Relation Age of Onset     No Known Problems Mother      No Known Problems Father      No Known Problems Sister      No Known Problems Brother      No Known Problems Son        Past Surgical History:   Procedure Laterality Date     NO PAST SURGERIES          Social History     Socioeconomic History     Marital status: Single     Spouse name: Not on file     Number of children: Not on file     Years of education: 10 in [employer]     Highest education level: Not on file   Occupational History     Not on file   Tobacco Use     Smoking status: Never     Passive exposure: Yes     Smokeless tobacco: Never     Tobacco comments:      smokes outside   Vaping Use     Vaping status: Never Used   Substance and Sexual Activity     Alcohol use: No     Drug use: No     Sexual activity: Not Currently     Partners: Male   Other Topics Concern     Not on file   Social History Narrative    [place] refugee. Lives with boyfriend not yet . Arrived in [place] to [place] 6/2016. Aunt  lives in [place]. Parents and 4 siblings [place] in [place].     Works as a PCA.      Social Determinants of Health     Financial Resource Strain: Not on file   Food Insecurity: Not on file   Transportation Needs: Not on file   Physical Activity: Not on file   Stress: Not on file   Social Connections: Not on file   Intimate Partner Violence: Not on file   Housing Stability: Not on file

## 2023-05-16 ENCOUNTER — PATIENT OUTREACH (OUTPATIENT)
Dept: CARE COORDINATION | Facility: CLINIC | Age: 28
End: 2023-05-16
Payer: COMMERCIAL

## 2023-05-16 PROBLEM — Z32.01 POSITIVE PREGNANCY TEST: Status: ACTIVE | Noted: 2023-05-16

## 2023-05-16 PROBLEM — R53.83 FATIGUE, UNSPECIFIED TYPE: Status: ACTIVE | Noted: 2023-05-16

## 2023-05-16 NOTE — PROGRESS NOTES
"Clinic Care Coordination Contact  Community Health Worker Initial Outreach    CHW Initial Information Gathering:  Referral Source: PCP  Preferred Hospital: [facility]  [phone]  Preferred Urgent Care: [facility] [place], [phone]  Current living arrangement:: I live in a private home with family  Type of residence:: Apartment  Community Resources: None, County Worker  Supplies Currently Used at Home: None  Equipment Currently Used at Home: none  Informal Support system:: Family  No PCP office visit in Past Year: No  Transportation means:: Family, Medical transport  CHW Additional Questions  If ED/Hospital discharge, follow-up appointment scheduled as recommended?: N/A  Medication changes made following ED/Hospital discharge?: N/A  MyChart active?: No  Patient agreeable to assistance with activating MyChart?: No    Patient accepts CC: No, patient is not interested in enrolling CCC at this time and resources given. Patient will be sent Care Coordination introduction letter for future reference.     CCC received referral from PCP to assist with, \"Patient has forms that she needs assistance with (head start prescreening and dental forms)\". CHW called and spoke with patient who stated her child was accepted at different school where other siblings are attending. Patient received notification letter for her child to schedule 6 months dental check up and patient will go to the [facility] and schedule an appointment. Patient does not need additional coordination assistance or resources at this time, CCC will no further do outreach at this time and PCP feel free to place new referral if needed.   "

## 2023-05-25 LAB
ABO/RH(D): NORMAL
ANTIBODY SCREEN: NEGATIVE
SPECIMEN EXPIRATION DATE: NORMAL

## 2023-05-26 ENCOUNTER — PRENATAL OFFICE VISIT (OUTPATIENT)
Dept: FAMILY MEDICINE | Facility: CLINIC | Age: 28
End: 2023-05-26
Payer: COMMERCIAL

## 2023-05-26 VITALS
DIASTOLIC BLOOD PRESSURE: 62 MMHG | SYSTOLIC BLOOD PRESSURE: 95 MMHG | OXYGEN SATURATION: 98 % | WEIGHT: 121 LBS | TEMPERATURE: 98.3 F | RESPIRATION RATE: 16 BRPM | HEART RATE: 95 BPM | HEIGHT: 60 IN | BODY MASS INDEX: 23.75 KG/M2

## 2023-05-26 DIAGNOSIS — Z11.3 SCREEN FOR STD (SEXUALLY TRANSMITTED DISEASE): ICD-10-CM

## 2023-05-26 DIAGNOSIS — Z12.4 SCREENING FOR MALIGNANT NEOPLASM OF CERVIX: ICD-10-CM

## 2023-05-26 DIAGNOSIS — Z34.90 ENCOUNTER FOR SUPERVISION OF NORMAL PREGNANCY, ANTEPARTUM, UNSPECIFIED GRAVIDITY: Primary | ICD-10-CM

## 2023-05-26 DIAGNOSIS — Z32.01 POSITIVE PREGNANCY TEST: ICD-10-CM

## 2023-05-26 LAB
ERYTHROCYTE [DISTWIDTH] IN BLOOD BY AUTOMATED COUNT: 13.6 % (ref 10–15)
HCT VFR BLD AUTO: 35.7 % (ref 35–47)
HGB BLD-MCNC: 12 G/DL (ref 11.7–15.7)
MCH RBC QN AUTO: 28.6 PG (ref 26.5–33)
MCHC RBC AUTO-ENTMCNC: 33.6 G/DL (ref 31.5–36.5)
MCV RBC AUTO: 85 FL (ref 78–100)
PLATELET # BLD AUTO: 256 10E3/UL (ref 150–450)
RBC # BLD AUTO: 4.2 10E6/UL (ref 3.8–5.2)
WBC # BLD AUTO: 8.4 10E3/UL (ref 4–11)

## 2023-05-26 PROCEDURE — 86762 RUBELLA ANTIBODY: CPT | Performed by: FAMILY MEDICINE

## 2023-05-26 PROCEDURE — 87340 HEPATITIS B SURFACE AG IA: CPT | Performed by: FAMILY MEDICINE

## 2023-05-26 PROCEDURE — 85027 COMPLETE CBC AUTOMATED: CPT | Performed by: FAMILY MEDICINE

## 2023-05-26 PROCEDURE — 86850 RBC ANTIBODY SCREEN: CPT | Performed by: FAMILY MEDICINE

## 2023-05-26 PROCEDURE — 87491 CHLMYD TRACH DNA AMP PROBE: CPT | Performed by: FAMILY MEDICINE

## 2023-05-26 PROCEDURE — G0145 SCR C/V CYTO,THINLAYER,RESCR: HCPCS | Performed by: FAMILY MEDICINE

## 2023-05-26 PROCEDURE — 99000 SPECIMEN HANDLING OFFICE-LAB: CPT | Performed by: FAMILY MEDICINE

## 2023-05-26 PROCEDURE — 87591 N.GONORRHOEAE DNA AMP PROB: CPT | Performed by: FAMILY MEDICINE

## 2023-05-26 PROCEDURE — 86780 TREPONEMA PALLIDUM: CPT | Performed by: FAMILY MEDICINE

## 2023-05-26 PROCEDURE — 99214 OFFICE O/P EST MOD 30 MIN: CPT | Performed by: FAMILY MEDICINE

## 2023-05-26 PROCEDURE — 87389 HIV-1 AG W/HIV-1&-2 AB AG IA: CPT | Performed by: FAMILY MEDICINE

## 2023-05-26 PROCEDURE — 36415 COLL VENOUS BLD VENIPUNCTURE: CPT | Performed by: FAMILY MEDICINE

## 2023-05-26 PROCEDURE — 87086 URINE CULTURE/COLONY COUNT: CPT | Performed by: FAMILY MEDICINE

## 2023-05-26 PROCEDURE — 86901 BLOOD TYPING SEROLOGIC RH(D): CPT | Performed by: FAMILY MEDICINE

## 2023-05-26 PROCEDURE — 83655 ASSAY OF LEAD: CPT | Mod: 90 | Performed by: FAMILY MEDICINE

## 2023-05-26 PROCEDURE — 86900 BLOOD TYPING SEROLOGIC ABO: CPT | Performed by: FAMILY MEDICINE

## 2023-05-26 RX ORDER — DOCONEXENT, NIACINAMIDE, .ALPHA.-TOCOPHEROL ACETATE, DL-, CHOLECALCIFEROL, BETA CAROTENE, ASCORBIC ACID, THIAMINE MONONITRATE, RIBOFLAVIN, PYRIDOXINE HYDROCHLORIDE, CYANOCOBALAMIN, IRON, ZINC OXIDE, CUPRIC OXIDE, POTASSIUM IODIDE, MAGNESIUM OXIDE, FOLIC ACID, AND LEVOMEFOLATE CALCIUM 200; 15; 20; 1000; 1100; 60; 1.6; 1.8; 2.5; 25; 90; 25; 2; 150; 20; 1; .6 MG/1; MG/1; [IU]/1; [IU]/1; [IU]/1; MG/1; MG/1; MG/1; MG/1; UG/1; MG/1; MG/1; MG/1; UG/1; MG/1; MG/1; MG/1
1 CAPSULE, LIQUID FILLED ORAL DAILY
Qty: 90 CAPSULE | Refills: 3 | Status: SHIPPED | OUTPATIENT
Start: 2023-05-26 | End: 2023-12-13

## 2023-05-26 NOTE — PROGRESS NOTES
New OB Clinic Note - 2023   Visit was completed along with Steffany .   SUBJECTIVE:  Christiano Renae is a 27 year old  female @ approximately 10-12 weeks who is here for new OB visit.   LMP last November and then didn't have a period but had negative pregnancy tests until April when it was positive. Started feeling nauseous in March.  Current Concerns: nausea, getting better. Taking unisom only, not zofran.   She denies bleeding, cramping. No loss of fluid. She denies HA.   Denies dysuria or hematuria.   Denies constipation.  OB History:  Patient is . Prior Pregnancies:  OB History    Para Term  AB Living   4 3 3 0 0 3   SAB IAB Ectopic Multiple Live Births   0 0 0 0 3      # Outcome Date GA Lbr Adeel/2nd Weight Sex Delivery Anes PTL Lv   4 Current            3 Term 21 39w5d 00:31 / 00:01 3.08 kg (6 lb 12.6 oz) F Vag-Spont None N MANNY      Name: PAW,FEMALE-MU      Apgar1: 8  Apgar5: 9   2 Term 18 39w0d  3.09 kg (6 lb 13 oz) F Vag-Spont IV N MANNY      Name: ADELINAFEMALE-MU      Apgar1: 8  Apgar5: 8   1 Term 17 39w4d 09:55 / 00:42 3.374 kg (7 lb 7 oz) M Vag-Spont Local N MANNY      Birth Comments: none      Name: Tara Alvarado Sourav      Apgar1: 9  Apgar5: 9     She does not have a history of Down's syndrome, sickle cell anemia or other genetic disorder affecting a child in her family.  She does not have a history of major depression or postpartum depression.    Social Hx:   She has support from her family and father of baby is involved.   She has not used tobacco, has not used EtOH and has not used illicit drugs.  Current Medications: prenatal vitamins, unisom  Past Medical History:   Diagnosis Date     Depo-Provera contraceptive status 2019     Fetal cardiac echogenic focus 2018     Medical history non-contributory      Poor fetal growth affecting management of mother in third trimester 3/15/2021     Varicella     Immune per lab 16      Past Surgical History:  "  Procedure Laterality Date     NO PAST SURGERIES       Family History   Problem Relation Age of Onset     No Known Problems Mother      No Known Problems Father      No Known Problems Sister      No Known Problems Brother      No Known Problems Son      Current Outpatient Medications   Medication     doxylamine (UNISOM) 25 MG TABS tablet     Prenat-Fe Poly-Methfol-FA-DHA (VITAFOL FE+) 90-0.6-0.4-200 MG CAPS     ondansetron (ZOFRAN ODT) 4 MG ODT tab     No current facility-administered medications for this visit.     ?  OBJECTIVE:  Vitals:    23 1316   BP: 95/62   Pulse: 95   Resp: 16   Temp: 98.3  F (36.8  C)   TempSrc: Oral   SpO2: 98%   Weight: 54.9 kg (121 lb)   Height: 1.513 m (4' 11.58\")     Gen: Awake, alert, in no acute distress  HEENT: oral mucosa is moist and pink, dentition is adequate   Neck: Thyroid is non-enlarged, without nodules or tenderness  CV: RRR with normal S1 and S2. No murmurs appreciated.  Resp: Lungs are clear to auscultation bilaterally without crackles or wheezing.  Abd: non-tender, non-distended. Bowel sounds present.   Pelvic Exam: Vagina and vulva are normal; no discharge is noted. Cervix normal without lesions. Uterus anteverted and mobile, normal in size and shape without tenderness. Adnexa normal in size without masses or tenderness. She is due for a pap smear today.  Lower extremities: No edema  Neuro: No focal deficits  Viewed fetus with bedside ultrasound, approximately 10-12 weeks? Was moving around.   No results found for any visits on 23.  ASSESSMENT/PLAN:  1. Christiano Renae is a 27 year old  at about 10-12 weeks by rough bedside ultrasound. Will get formal dating ultrasound..   2. Discussed recommended weight gain, healthy eating habits, exercise, common first trimester concerns (nausea, vomiting, constipation, fatigue, GERD, urinary frequency) and warning signs for miscarriage and  labor (bleeding, cramping).   3. Offered NIPT. Patient " accepted.  4. Counseled on benefits of breastfeeding. Patient is planning to breastfeed.   5. Discussed seatbelts, diet, exercise, caffeine intake, daily vitamins, child birth classes, choosing a baby doctor, and regular prenatal exams   Christiano was seen today for prenatal care.    Diagnoses and all orders for this visit:    Encounter for supervision of normal pregnancy, antepartum, unspecified   -     ABO/Rh type and screen; Future  -     Hepatitis B surface antigen; Future  -     CBC with platelets; Future  -     HIV Antigen Antibody Combo; Future  -     Rubella Antibody IgG; Future  -     Treponema Abs w Reflex to RPR and Titer; Future  -     Urine Culture Aerobic Bacterial; Future  -     Lead, Venous Blood; Future  -     US OB < 14 Weeks Single; Future  -     Non Invasive Prenatal Test Cell Free DNA; Future  -     ABO/Rh type and screen  -     Hepatitis B surface antigen  -     CBC with platelets  -     HIV Antigen Antibody Combo  -     Rubella Antibody IgG  -     Treponema Abs w Reflex to RPR and Titer  -     Lead, Venous Blood  -     Non Invasive Prenatal Test Cell Free DNA  -     Urine Culture Aerobic Bacterial    Screen for STD (sexually transmitted disease)  -     Chlamydia & Gonorrhea by PCR, [facility] - Clinic Collect    Positive pregnancy test  -     Prenat-Fe Poly-Methfol-FA-DHA (VITAFOL FE+) 90-0.6-0.4-200 MG CAPS; Take 1 capsule by mouth daily    Screening for malignant neoplasm of cervix  -     Pap screen reflex to HPV if ASCUS - recommend age 25 - 29      ?  RTC in 4 weeks or sooner if problems. At next visit: discuss nausea, dating, breastfeeding.    Erica Umaña MD    Options for treatment and follow-up care were reviewed with the patient and/or guardian. Christiano Pascual Paw and/or guardian engaged in the decision making process and verbalized understanding of the options discussed and agreed with the final plan.

## 2023-05-27 LAB
C TRACH DNA SPEC QL PROBE+SIG AMP: NEGATIVE
LEAD BLDV-MCNC: <2 UG/DL
N GONORRHOEA DNA SPEC QL NAA+PROBE: NEGATIVE
T PALLIDUM AB SER QL: NONREACTIVE

## 2023-05-28 LAB
BACTERIA UR CULT: NORMAL
HBV SURFACE AG SERPL QL IA: NONREACTIVE
HIV 1+2 AB+HIV1 P24 AG SERPL QL IA: NONREACTIVE

## 2023-05-30 LAB
RUBV IGG SERPL QL IA: 0.65 INDEX
RUBV IGG SERPL QL IA: NORMAL

## 2023-05-31 LAB
BKR LAB AP GYN ADEQUACY: NORMAL
BKR LAB AP GYN INTERPRETATION: NORMAL
BKR LAB AP HPV REFLEX: NORMAL
BKR LAB AP PREVIOUS ABNORMAL: NORMAL
PATH REPORT.COMMENTS IMP SPEC: NORMAL
PATH REPORT.COMMENTS IMP SPEC: NORMAL
PATH REPORT.RELEVANT HX SPEC: NORMAL

## 2023-06-05 LAB — SCANNED LAB RESULT: NORMAL

## 2023-06-09 ENCOUNTER — HOSPITAL ENCOUNTER (OUTPATIENT)
Dept: ULTRASOUND IMAGING | Facility: HOSPITAL | Age: 28
Discharge: HOME | End: 2023-06-09
Attending: FAMILY MEDICINE | Admitting: FAMILY MEDICINE
Payer: COMMERCIAL

## 2023-06-09 DIAGNOSIS — Z34.90 ENCOUNTER FOR SUPERVISION OF NORMAL PREGNANCY, ANTEPARTUM, UNSPECIFIED GRAVIDITY: ICD-10-CM

## 2023-06-09 PROCEDURE — 76805 OB US >/= 14 WKS SNGL FETUS: CPT

## 2023-06-26 ENCOUNTER — PRENATAL OFFICE VISIT (OUTPATIENT)
Dept: FAMILY MEDICINE | Facility: CLINIC | Age: 28
End: 2023-06-26
Payer: COMMERCIAL

## 2023-06-26 VITALS
TEMPERATURE: 98.2 F | BODY MASS INDEX: 24.15 KG/M2 | OXYGEN SATURATION: 98 % | RESPIRATION RATE: 16 BRPM | DIASTOLIC BLOOD PRESSURE: 62 MMHG | WEIGHT: 123 LBS | HEART RATE: 92 BPM | HEIGHT: 60 IN | SYSTOLIC BLOOD PRESSURE: 94 MMHG

## 2023-06-26 DIAGNOSIS — Z34.92 ENCOUNTER FOR SUPERVISION OF NORMAL PREGNANCY IN SECOND TRIMESTER, UNSPECIFIED GRAVIDITY: Primary | ICD-10-CM

## 2023-06-26 DIAGNOSIS — Z53.9 DIAGNOSIS NOT YET DEFINED: Primary | ICD-10-CM

## 2023-06-26 PROCEDURE — G0179 MD RECERTIFICATION HHA PT: HCPCS | Performed by: FAMILY MEDICINE

## 2023-06-26 PROCEDURE — 99207 PR PRENATAL VISIT: CPT | Performed by: FAMILY MEDICINE

## 2023-06-26 NOTE — PROGRESS NOTES
"SUBJECTIVE:  Christiano Renae  at 16w3d by 14 week ultrasound. Estimated Date of Delivery: Dec 8, 2023.  She is doing well. She no longer has any nausea and vomiting. She denies abdominal pain/cramping, vaginal bleeding, leakage of fluid. Fetal movement is not yet present.   Concerns: none  ROS  Negative except as noted above in HPI.  OBJECTIVE:  Blood pressure 94/62, pulse 92, temperature 98.2  F (36.8  C), temperature source Oral, resp. rate 16, height 1.535 m (5' 0.43\"), weight 55.8 kg (123 lb), SpO2 98 %, not currently breastfeeding.  Gen: comfortable, no acute distress.  See OB Vitals flowsheet.  ASSESSMENT/PLAN:  Christiano was seen today for prenatal care.    Diagnoses and all orders for this visit:    Encounter for supervision of normal pregnancy in second trimester, unspecified       -IUP at 16w3d:     Prenatal labs reviewed and normal.     Cell-free DNA testing done, results came back normal. It's a boy.     Breast/Bottle: breast     RTC in 4 weeks or sooner with problems.    Visit completed along with assistance of Steffany .    Erica Umaña MD    "

## 2023-07-21 ENCOUNTER — PRENATAL OFFICE VISIT (OUTPATIENT)
Dept: FAMILY MEDICINE | Facility: CLINIC | Age: 28
End: 2023-07-21
Payer: COMMERCIAL

## 2023-07-21 VITALS
RESPIRATION RATE: 18 BRPM | WEIGHT: 130 LBS | HEIGHT: 61 IN | HEART RATE: 88 BPM | SYSTOLIC BLOOD PRESSURE: 96 MMHG | TEMPERATURE: 98.1 F | OXYGEN SATURATION: 98 % | DIASTOLIC BLOOD PRESSURE: 63 MMHG | BODY MASS INDEX: 24.55 KG/M2

## 2023-07-21 DIAGNOSIS — Z34.92 ENCOUNTER FOR SUPERVISION OF NORMAL PREGNANCY IN SECOND TRIMESTER, UNSPECIFIED GRAVIDITY: Primary | ICD-10-CM

## 2023-07-21 PROCEDURE — 99207 PR PRENATAL VISIT: CPT | Performed by: FAMILY MEDICINE

## 2023-07-21 ASSESSMENT — PAIN SCALES - GENERAL: PAINLEVEL: NO PAIN (0)

## 2023-07-21 NOTE — PROGRESS NOTES
"SUBJECTIVE:  Christiano Renae  at 20w0d by 14 week ultrasound. Estimated Date of Delivery: Dec 8, 2023.  She is doing well. She does not have nausea and vomiting. She denies abdominal pain/cramping, vaginal bleeding, leakage of fluid. Fetal movement is present.   Concerns: none  ROS  Negative except as noted above in HPI.  OBJECTIVE:  Blood pressure 96/63, pulse 88, temperature 98.1  F (36.7  C), temperature source Oral, resp. rate 18, height 1.546 m (5' 0.87\"), weight 59 kg (130 lb), SpO2 98 %, not currently breastfeeding.  Gen: comfortable, no acute distress.  See OB Vitals flowsheet.  ASSESSMENT/PLAN:  Christiano was seen today for prenatal care.    Diagnoses and all orders for this visit:    Encounter for supervision of normal pregnancy in second trimester, unspecified   -     US OB > 14 Weeks; Future      -IUP at 20w0d:     Prenatal labs reviewed and normal.     Cell-free DNA testing done, results came back normal.      Peripartum Anesthesia: the patient does not desire an epidural during labor.     Breast/Bottle: breast     RTC in 4 weeks or sooner with problems.    Visit completed along with assistance of Steffany .    Erica Umaña MD    "

## 2023-07-26 ENCOUNTER — HOSPITAL ENCOUNTER (OUTPATIENT)
Dept: ULTRASOUND IMAGING | Facility: HOSPITAL | Age: 28
Discharge: HOME | End: 2023-07-26
Attending: FAMILY MEDICINE | Admitting: FAMILY MEDICINE
Payer: COMMERCIAL

## 2023-07-26 DIAGNOSIS — Z34.92 ENCOUNTER FOR SUPERVISION OF NORMAL PREGNANCY IN SECOND TRIMESTER, UNSPECIFIED GRAVIDITY: ICD-10-CM

## 2023-07-26 PROCEDURE — 76816 OB US FOLLOW-UP PER FETUS: CPT

## 2023-08-17 ENCOUNTER — PRENATAL OFFICE VISIT (OUTPATIENT)
Dept: FAMILY MEDICINE | Facility: CLINIC | Age: 28
End: 2023-08-17
Payer: COMMERCIAL

## 2023-08-17 VITALS
OXYGEN SATURATION: 98 % | HEART RATE: 95 BPM | SYSTOLIC BLOOD PRESSURE: 92 MMHG | TEMPERATURE: 98.3 F | BODY MASS INDEX: 25.43 KG/M2 | RESPIRATION RATE: 16 BRPM | WEIGHT: 134 LBS | DIASTOLIC BLOOD PRESSURE: 56 MMHG

## 2023-08-17 DIAGNOSIS — Z34.82 ENCOUNTER FOR SUPERVISION OF OTHER NORMAL PREGNANCY IN SECOND TRIMESTER: Primary | ICD-10-CM

## 2023-08-17 PROCEDURE — 99207 PR PRENATAL VISIT: CPT | Performed by: FAMILY MEDICINE

## 2023-08-17 NOTE — PROGRESS NOTES
SUBJECTIVE:  Christiano Garner Paw  at 23w6d by early ultrasound. Estimated Date of Delivery: Dec 8, 2023.  She is doing well. She no longer has nausea and vomiting. She denies abdominal pain/cramping, vaginal bleeding, leakage of fluid. Fetal movement is present.   Concerns: none  ROS  Negative except as noted above in HPI.  OBJECTIVE:  Blood pressure 92/56, pulse 95, temperature 98.3  F (36.8  C), temperature source Oral, resp. rate 16, weight 60.8 kg (134 lb), SpO2 98 %, not currently breastfeeding.  Gen: comfortable, no acute distress.  See OB Vitals flowsheet.  ASSESSMENT/PLAN:  Christiano was seen today for prenatal care.    Diagnoses and all orders for this visit:    Encounter for supervision of other normal pregnancy in second trimester      -IUP at 23w6d:   Prenatal labs reviewed and normal.   Cell-free DNA testing done, results came back normal.    Fetal survey was done at 20 weeks and was normal except prominent left renal pelvis.   Peripartum Anesthesia: the patient does not desire an epidural during labor.   Post-partum Contraception: not yet discussed   Breast/Bottle: breast   RTC in 4 weeks or sooner with problems.    Visit completed along with assistance of Steffany .    Erica Umaña MD

## 2023-08-17 NOTE — LETTER
August 17, 2023      Christiano Renae  [address]  [place] [address]            To Whom It May Concern:        Christiano Renae is pregnant with a due date of 12/8/2023.         Sincerely,        Erica Umaña MD           admission

## 2023-09-18 ENCOUNTER — PRENATAL OFFICE VISIT (OUTPATIENT)
Dept: FAMILY MEDICINE | Facility: CLINIC | Age: 28
End: 2023-09-18
Payer: COMMERCIAL

## 2023-09-18 VITALS
DIASTOLIC BLOOD PRESSURE: 67 MMHG | TEMPERATURE: 97.2 F | WEIGHT: 140.12 LBS | SYSTOLIC BLOOD PRESSURE: 95 MMHG | RESPIRATION RATE: 16 BRPM | HEART RATE: 107 BPM | HEIGHT: 61 IN | BODY MASS INDEX: 26.46 KG/M2 | OXYGEN SATURATION: 97 %

## 2023-09-18 DIAGNOSIS — N89.8 VAGINAL DISCHARGE: ICD-10-CM

## 2023-09-18 DIAGNOSIS — B37.31 YEAST INFECTION OF THE VAGINA: ICD-10-CM

## 2023-09-18 DIAGNOSIS — Z34.93 ENCOUNTER FOR SUPERVISION OF NORMAL PREGNANCY IN THIRD TRIMESTER, UNSPECIFIED GRAVIDITY: Primary | ICD-10-CM

## 2023-09-18 LAB
CLUE CELLS: ABNORMAL
TRICHOMONAS, WET PREP: ABNORMAL
WBC'S/HIGH POWER FIELD, WET PREP: ABNORMAL
YEAST, WET PREP: PRESENT

## 2023-09-18 PROCEDURE — 99207 PR PRENATAL VISIT: CPT | Performed by: FAMILY MEDICINE

## 2023-09-18 PROCEDURE — 87210 SMEAR WET MOUNT SALINE/INK: CPT | Performed by: FAMILY MEDICINE

## 2023-09-18 NOTE — PROGRESS NOTES
"SUBJECTIVE:  Christiano Renae  at 28w3d by early ultrasound. Estimated Date of Delivery: Dec 8, 2023.  She is doing well. She denies vaginal bleeding, leakage of fluid, or urinary symptoms. Fetal movement is present. She is not having contractions.  Concerns: very itchy vaginal discharge the past 3 weeks.   ROS  Negative except as noted above in HPI  OBJECTIVE:  Blood pressure 95/67, pulse 107, temperature 97.2  F (36.2  C), temperature source Temporal, resp. rate 16, height 1.545 m (5' 0.83\"), weight 63.6 kg (140 lb 1.9 oz), SpO2 97 %, not currently breastfeeding.  Gen: Comfortable, no acute distress.  Abd: Gravid, non-tender  See OB Vitals flowsheet.  ASSESSMENT/PLAN:  Christiano was seen today for prenatal care.    Diagnoses and all orders for this visit:    Encounter for supervision of normal pregnancy in third trimester, unspecified     Vaginal discharge  -     Wet prep - Clinic Collect    Yeast infection of the vagina:   -     miconazole nitrate (MONISTAT) 4 % cream; Place 1 applicator vaginally At Bedtime for 5 days      -IUP at 28w3d:   Prenatal labs reviewed and normal.   Cell-free DNA testing done; results came back normal.    Fetal survey was done at 20 weeks and was normal except prominent left renal pelvis. Recheck in 3rd trimester.   Peripartum Anesthesia: the patient does not desire an epidural during labor.   Post-partum Contraception: not yet discussed   Breast/Bottle: Breast   Reviewed plans for getting to the hospital.  RTC in 4 weeks or sooner with problems.    Visit completed along with assistance of Steffany .  Erica Umaña MD    "

## 2023-10-02 ENCOUNTER — PRENATAL OFFICE VISIT (OUTPATIENT)
Dept: FAMILY MEDICINE | Facility: CLINIC | Age: 28
End: 2023-10-02
Payer: COMMERCIAL

## 2023-10-02 VITALS
DIASTOLIC BLOOD PRESSURE: 60 MMHG | WEIGHT: 141.75 LBS | SYSTOLIC BLOOD PRESSURE: 98 MMHG | RESPIRATION RATE: 16 BRPM | OXYGEN SATURATION: 98 % | HEIGHT: 61 IN | BODY MASS INDEX: 26.76 KG/M2 | TEMPERATURE: 98 F | HEART RATE: 107 BPM

## 2023-10-02 DIAGNOSIS — Z34.83 ENCOUNTER FOR SUPERVISION OF OTHER NORMAL PREGNANCY IN THIRD TRIMESTER: Primary | ICD-10-CM

## 2023-10-02 LAB
GLUCOSE 1H P 50 G GLC PO SERPL-MCNC: 86 MG/DL (ref 70–129)
HGB BLD-MCNC: 11.8 G/DL (ref 11.7–15.7)

## 2023-10-02 PROCEDURE — 90471 IMMUNIZATION ADMIN: CPT | Performed by: FAMILY MEDICINE

## 2023-10-02 PROCEDURE — 99207 PR PRENATAL VISIT: CPT | Performed by: FAMILY MEDICINE

## 2023-10-02 PROCEDURE — 82950 GLUCOSE TEST: CPT | Performed by: FAMILY MEDICINE

## 2023-10-02 PROCEDURE — 36415 COLL VENOUS BLD VENIPUNCTURE: CPT | Performed by: FAMILY MEDICINE

## 2023-10-02 PROCEDURE — 86780 TREPONEMA PALLIDUM: CPT | Performed by: FAMILY MEDICINE

## 2023-10-02 PROCEDURE — 90686 IIV4 VACC NO PRSV 0.5 ML IM: CPT | Performed by: FAMILY MEDICINE

## 2023-10-02 PROCEDURE — 85018 HEMOGLOBIN: CPT | Performed by: FAMILY MEDICINE

## 2023-10-02 NOTE — PROGRESS NOTES
"SUBJECTIVE:  Christiano Renae  at 30w3d by early ultrasound. Estimated Date of Delivery: Dec 8, 2023.  She is doing well. She denies vaginal bleeding, leakage of fluid, or urinary symptoms. Fetal movement is present. She is not having contractions.  Concerns: none  ROS  Negative except as noted above in HPI  OBJECTIVE:  Blood pressure 98/60, pulse 107, temperature 98  F (36.7  C), temperature source Temporal, resp. rate 16, height 1.545 m (5' 0.83\"), weight 64.3 kg (141 lb 12 oz), SpO2 98 %, not currently breastfeeding.  Gen: Comfortable, no acute distress.  Abd: Gravid, non-tender  See OB Vitals flowsheet.  ASSESSMENT/PLAN:  Christiano was seen today for prenatal care.    Diagnoses and all orders for this visit:    Encounter for supervision of other normal pregnancy in third trimester  -     Glucose tolerance, gest screen, 1 hour  -     Hemoglobin  -     Treponema Abs w Reflex to RPR and Titer  -     INFLUENZA VACCINE >6 MONTHS (AFLURIA/FLUZONE)      -IUP at 30w3d:   Prenatal labs reviewed and normal.   Cell-free DNA testing done; results came back normal.    Fetal survey was done at 20 weeks and was normal. Mildly prominent left renal pelvis. Recheck around 36 weeks  Peripartum Anesthesia: the patient does not desire an epidural during labor.   Post-partum Contraception: discuss at next visit   Breast/Bottle: Breast   Reviewed plans for getting to the hospital.  RTC in 2 weeks or sooner with problems.    Visit completed along with assistance of Steffany .  Erica Umaña MD    "

## 2023-10-03 LAB — T PALLIDUM AB SER QL: NONREACTIVE

## 2023-10-16 ENCOUNTER — PRENATAL OFFICE VISIT (OUTPATIENT)
Dept: FAMILY MEDICINE | Facility: CLINIC | Age: 28
End: 2023-10-16
Payer: COMMERCIAL

## 2023-10-16 VITALS
RESPIRATION RATE: 16 BRPM | BODY MASS INDEX: 26.76 KG/M2 | WEIGHT: 141.75 LBS | TEMPERATURE: 98 F | OXYGEN SATURATION: 97 % | DIASTOLIC BLOOD PRESSURE: 58 MMHG | SYSTOLIC BLOOD PRESSURE: 94 MMHG | HEART RATE: 97 BPM | HEIGHT: 61 IN

## 2023-10-16 DIAGNOSIS — Z34.93 ENCOUNTER FOR SUPERVISION OF NORMAL PREGNANCY IN THIRD TRIMESTER, UNSPECIFIED GRAVIDITY: Primary | ICD-10-CM

## 2023-10-16 PROCEDURE — 99207 PR PRENATAL VISIT: CPT | Performed by: FAMILY MEDICINE

## 2023-10-16 NOTE — PROGRESS NOTES
"SUBJECTIVE:  Christiano Renae  at 32w3d by early ultrasound. Estimated Date of Delivery: Dec 8, 2023.  She is doing well. She denies vaginal bleeding, leakage of fluid, or urinary symptoms. Fetal movement is present. She is not having contractions.  Concerns: none  ROS  Negative except as noted above in HPI  OBJECTIVE:  Blood pressure 94/58, pulse 97, temperature 98  F (36.7  C), temperature source Oral, resp. rate 16, height 1.545 m (5' 0.83\"), weight 64.3 kg (141 lb 12 oz), SpO2 97%, not currently breastfeeding.  Gen: Comfortable, no acute distress.  Abd: Gravid, non-tender  See OB Vitals flowsheet.  ASSESSMENT/PLAN:  Christiano was seen today for prenatal care.    Diagnoses and all orders for this visit:    Encounter for supervision of normal pregnancy in third trimester, unspecified       -IUP at 32w3d:   Prenatal labs reviewed and normal.   Cell-free DNA testing done; results came back normal.    Fetal survey was done at 20 weeks and was normal except for prominent left renal pelvis. Order repeat ultrasound at next appt   Peripartum Anesthesia: the patient does not desire an epidural during labor.   Post-partum Contraception: nexplanon   Breast/Bottle: Breast   Reviewed plans for getting to the hospital.  RTC in 2 weeks or sooner with problems.    Visit completed along with assistance of Steffany .  Erica Umaña MD     "

## 2023-10-30 ENCOUNTER — PRENATAL OFFICE VISIT (OUTPATIENT)
Dept: FAMILY MEDICINE | Facility: CLINIC | Age: 28
End: 2023-10-30
Payer: COMMERCIAL

## 2023-10-30 VITALS
HEIGHT: 61 IN | TEMPERATURE: 98.2 F | OXYGEN SATURATION: 99 % | BODY MASS INDEX: 26.67 KG/M2 | RESPIRATION RATE: 16 BRPM | WEIGHT: 141.25 LBS | DIASTOLIC BLOOD PRESSURE: 62 MMHG | SYSTOLIC BLOOD PRESSURE: 94 MMHG | HEART RATE: 93 BPM

## 2023-10-30 DIAGNOSIS — Z34.93 ENCOUNTER FOR SUPERVISION OF NORMAL PREGNANCY IN THIRD TRIMESTER, UNSPECIFIED GRAVIDITY: Primary | ICD-10-CM

## 2023-10-30 DIAGNOSIS — O35.EXX0 ENCOUNTER FOR REPEAT ULTRASOUND OF FETAL PYELECTASIS IN SINGLETON PREGNANCY, ANTEPARTUM: ICD-10-CM

## 2023-10-30 PROCEDURE — 99207 PR PRENATAL VISIT: CPT | Performed by: FAMILY MEDICINE

## 2023-10-30 NOTE — PROGRESS NOTES
"SUBJECTIVE:  Christiano Renae  at 34w3d by early ultrasound. Estimated Date of Delivery: Dec 8, 2023.  She is doing well. She denies vaginal bleeding, leakage of fluid, or urinary symptoms. Fetal movement is present. She is not having contractions.  Concerns: none  ROS  Negative except as noted above in HPI  OBJECTIVE:  Blood pressure 94/62, pulse 93, temperature 98.2  F (36.8  C), temperature source Oral, resp. rate 16, height 1.545 m (5' 0.83\"), weight 64.1 kg (141 lb 4 oz), SpO2 99%, not currently breastfeeding.  Gen: Comfortable, no acute distress.  Abd: Gravid, non-tender  See OB Vitals flowsheet.  ASSESSMENT/PLAN:  Christiano was seen today for prenatal care.    Diagnoses and all orders for this visit:    Encounter for repeat ultrasound of fetal pyelectasis in [place] pregnancy, antepartum  -     US OB > 14 Weeks; Future      -IUP at 34w3d:   Prenatal labs reviewed and normal.   Cell-free DNA testing done; results came back normal.    Fetal survey was done at 20 weeks and was normal except for prominent left renal pelvis  GBS next time   Peripartum Anesthesia: the patient does not desire an epidural during labor.   Post-partum Contraception: nexplanon   Breast/Bottle: Breast   Reviewed plans for getting to the hospital.  RTC in 2 weeks or sooner with problems.    Visit completed along with assistance of Steffany .  Erica Umaña MD    "

## 2023-11-07 ENCOUNTER — HOSPITAL ENCOUNTER (OUTPATIENT)
Dept: ULTRASOUND IMAGING | Facility: HOSPITAL | Age: 28
Discharge: HOME | End: 2023-11-07
Attending: FAMILY MEDICINE | Admitting: FAMILY MEDICINE
Payer: COMMERCIAL

## 2023-11-07 DIAGNOSIS — O35.EXX0 ENCOUNTER FOR REPEAT ULTRASOUND OF FETAL PYELECTASIS IN SINGLETON PREGNANCY, ANTEPARTUM: ICD-10-CM

## 2023-11-07 PROCEDURE — 76816 OB US FOLLOW-UP PER FETUS: CPT

## 2023-11-13 ENCOUNTER — PRENATAL OFFICE VISIT (OUTPATIENT)
Dept: FAMILY MEDICINE | Facility: CLINIC | Age: 28
End: 2023-11-13
Payer: COMMERCIAL

## 2023-11-13 VITALS
BODY MASS INDEX: 26.67 KG/M2 | WEIGHT: 141.25 LBS | OXYGEN SATURATION: 97 % | HEIGHT: 61 IN | SYSTOLIC BLOOD PRESSURE: 91 MMHG | DIASTOLIC BLOOD PRESSURE: 65 MMHG | TEMPERATURE: 98.2 F | RESPIRATION RATE: 16 BRPM | HEART RATE: 84 BPM

## 2023-11-13 DIAGNOSIS — Z34.93 ENCOUNTER FOR SUPERVISION OF NORMAL PREGNANCY IN THIRD TRIMESTER, UNSPECIFIED GRAVIDITY: Primary | ICD-10-CM

## 2023-11-13 PROCEDURE — 87653 STREP B DNA AMP PROBE: CPT | Performed by: FAMILY MEDICINE

## 2023-11-13 PROCEDURE — 90678 RSV VACC PREF BIVALENT IM: CPT | Performed by: FAMILY MEDICINE

## 2023-11-13 PROCEDURE — 96372 THER/PROPH/DIAG INJ SC/IM: CPT | Performed by: FAMILY MEDICINE

## 2023-11-13 PROCEDURE — 99207 PR PRENATAL VISIT: CPT | Performed by: FAMILY MEDICINE

## 2023-11-13 NOTE — PROGRESS NOTES
"SUBJECTIVE:  Christiano Renae  at 36w3d by early ultrasound. Estimated Date of Delivery: Dec 8, 2023.  She is doing well. She denies vaginal bleeding, leakage of fluid, or urinary symptoms. Fetal movement is present. She is not having contractions.  Concerns: none  ROS  Negative except as noted above in HPI  OBJECTIVE:  Blood pressure 91/65, pulse 84, temperature 98.2  F (36.8  C), temperature source Oral, resp. rate 16, height 1.545 m (5' 0.83\"), weight 64.1 kg (141 lb 4 oz), SpO2 97%, not currently breastfeeding.  Gen: Comfortable, no acute distress.  Abd: Gravid, non-tender  See OB Vitals flowsheet.  ASSESSMENT/PLAN:  Christiano was seen today for prenatal care.    Diagnoses and all orders for this visit:    Encounter for supervision of normal pregnancy in third trimester, unspecified   -     Group B strep PCR      -IUP at 36w3d:   Prenatal labs reviewed and normal.   Cell-free DNA testing done; results came back normal.    Fetal survey was done at 20 weeks and was normal. Baby still has mild left-sided pelviectasis  GBS swab today.   Peripartum Anesthesia: the patient does not desire an epidural during labor.   Post-partum Contraception: nexplanon   Breast/Bottle: Breast   Reviewed plans for getting to the hospital.  RTC in 1 week or sooner with problems.    Visit completed along with assistance of Steffany .  Erica Umaña MD    "

## 2023-11-14 LAB — GP B STREP DNA SPEC QL NAA+PROBE: POSITIVE

## 2023-11-20 ENCOUNTER — PRENATAL OFFICE VISIT (OUTPATIENT)
Dept: FAMILY MEDICINE | Facility: CLINIC | Age: 28
End: 2023-11-20
Payer: COMMERCIAL

## 2023-11-20 VITALS
DIASTOLIC BLOOD PRESSURE: 66 MMHG | HEART RATE: 84 BPM | RESPIRATION RATE: 20 BRPM | OXYGEN SATURATION: 98 % | TEMPERATURE: 98 F | WEIGHT: 142.25 LBS | SYSTOLIC BLOOD PRESSURE: 98 MMHG | HEIGHT: 61 IN | BODY MASS INDEX: 26.86 KG/M2

## 2023-11-20 DIAGNOSIS — E55.9 VITAMIN D DEFICIENCY: ICD-10-CM

## 2023-11-20 DIAGNOSIS — Z34.93 ENCOUNTER FOR SUPERVISION OF NORMAL PREGNANCY IN THIRD TRIMESTER, UNSPECIFIED GRAVIDITY: Primary | ICD-10-CM

## 2023-11-20 DIAGNOSIS — O26.813 FATIGUE DURING PREGNANCY IN THIRD TRIMESTER: ICD-10-CM

## 2023-11-20 PROCEDURE — 99213 OFFICE O/P EST LOW 20 MIN: CPT | Performed by: FAMILY MEDICINE

## 2023-11-20 NOTE — PROGRESS NOTES
"SUBJECTIVE:  Christiano Renae  at 37w3d by early ultrasound. Estimated Date of Delivery: Dec 8, 2023.  She is doing well. She denies vaginal bleeding, leakage of fluid, or urinary symptoms. Fetal movement is present. She is not having contractions.  Concerns: body feels tired, ralph hands and legs.   ROS  Negative except as noted above in HPI  OBJECTIVE:  Blood pressure 98/66, pulse 84, temperature 98  F (36.7  C), temperature source Oral, resp. rate 20, height 1.545 m (5' 0.83\"), weight 64.5 kg (142 lb 4 oz), SpO2 98%, not currently breastfeeding.  Gen: Comfortable, no acute distress.  Abd: Gravid, non-tender  See OB Vitals flowsheet.  ASSESSMENT/PLAN:  Christiano was seen today for prenatal care.    Diagnoses and all orders for this visit:    Encounter for supervision of normal pregnancy in third trimester, unspecified     Fatigue during pregnancy in third trimester  -     TSH with free T4 reflex; Future  -     Vitamin D deficiency screening; Future    Vitamin D deficiency  -     Vitamin D deficiency screening; Future      -IUP at 37w3d:   Prenatal labs reviewed and normal.   Cell-free DNA testing done; results came back normal.    Fetal survey was done at 20 weeks and was normal.   GBS status is positive. Discuss next time.   Peripartum Anesthesia: the patient does not desire an epidural during labor.   Post-partum Contraception: nexplanon   Breast/Bottle: Breast   Reviewed plans for getting to the hospital.  RTC in 1 week or sooner with problems.    Visit completed along with assistance of Steffany .  Erica Umaña MD    "

## 2023-11-26 ENCOUNTER — HOSPITAL ENCOUNTER (INPATIENT)
Facility: HOSPITAL | Age: 28
LOS: 2 days | Discharge: HOME | End: 2023-11-28
Attending: FAMILY MEDICINE | Admitting: FAMILY MEDICINE
Payer: COMMERCIAL

## 2023-11-26 PROBLEM — O99.820 GBS (GROUP B STREPTOCOCCUS CARRIER), +RV CULTURE, CURRENTLY PREGNANT: Status: ACTIVE | Noted: 2023-11-26

## 2023-11-26 PROBLEM — O35.EXX0 RENAL ABNORMALITY OF FETUS ON PRENATAL ULTRASOUND: Status: ACTIVE | Noted: 2023-11-26

## 2023-11-26 PROBLEM — Z60.3 LANGUAGE BARRIER AFFECTING HEALTH CARE: Status: ACTIVE | Noted: 2023-11-26

## 2023-11-26 PROBLEM — Z34.90 PREGNANT: Status: ACTIVE | Noted: 2023-11-26

## 2023-11-26 PROBLEM — Z75.8 LANGUAGE BARRIER AFFECTING HEALTH CARE: Status: ACTIVE | Noted: 2023-11-26

## 2023-11-26 LAB
ABO/RH(D): NORMAL
ANTIBODY SCREEN: NEGATIVE
HGB BLD-MCNC: 12.1 G/DL (ref 11.7–15.7)
HOLD SPECIMEN: NORMAL
HOLD SPECIMEN: NORMAL
SPECIMEN EXPIRATION DATE: NORMAL

## 2023-11-26 PROCEDURE — 86780 TREPONEMA PALLIDUM: CPT | Performed by: FAMILY MEDICINE

## 2023-11-26 PROCEDURE — 86704 HEP B CORE ANTIBODY TOTAL: CPT | Performed by: FAMILY MEDICINE

## 2023-11-26 PROCEDURE — 86706 HEP B SURFACE ANTIBODY: CPT | Performed by: FAMILY MEDICINE

## 2023-11-26 PROCEDURE — 250N000009 HC RX 250: Performed by: FAMILY MEDICINE

## 2023-11-26 PROCEDURE — 250N000013 HC RX MED GY IP 250 OP 250 PS 637: Performed by: FAMILY MEDICINE

## 2023-11-26 PROCEDURE — 36415 COLL VENOUS BLD VENIPUNCTURE: CPT | Performed by: FAMILY MEDICINE

## 2023-11-26 PROCEDURE — 86901 BLOOD TYPING SEROLOGIC RH(D): CPT | Performed by: FAMILY MEDICINE

## 2023-11-26 PROCEDURE — 85018 HEMOGLOBIN: CPT | Performed by: FAMILY MEDICINE

## 2023-11-26 PROCEDURE — 250N000011 HC RX IP 250 OP 636: Mod: JZ | Performed by: FAMILY MEDICINE

## 2023-11-26 PROCEDURE — 120N000001 HC R&B MED SURG/OB

## 2023-11-26 PROCEDURE — 722N000001 HC LABOR CARE VAGINAL DELIVERY SINGLE

## 2023-11-26 PROCEDURE — 86850 RBC ANTIBODY SCREEN: CPT | Performed by: FAMILY MEDICINE

## 2023-11-26 PROCEDURE — 87340 HEPATITIS B SURFACE AG IA: CPT | Performed by: FAMILY MEDICINE

## 2023-11-26 PROCEDURE — 59400 OBSTETRICAL CARE: CPT | Performed by: FAMILY MEDICINE

## 2023-11-26 RX ORDER — PENICILLIN G POTASSIUM 5000000 [IU]/1
5 INJECTION, POWDER, FOR SOLUTION INTRAMUSCULAR; INTRAVENOUS ONCE
Status: COMPLETED | OUTPATIENT
Start: 2023-11-26 | End: 2023-11-26

## 2023-11-26 RX ORDER — TRANEXAMIC ACID 10 MG/ML
1 INJECTION, SOLUTION INTRAVENOUS EVERY 30 MIN PRN
Status: DISCONTINUED | OUTPATIENT
Start: 2023-11-26 | End: 2023-11-28 | Stop reason: HOSPADM

## 2023-11-26 RX ORDER — HYDROCORTISONE 25 MG/G
CREAM TOPICAL 3 TIMES DAILY PRN
Status: DISCONTINUED | OUTPATIENT
Start: 2023-11-26 | End: 2023-11-28 | Stop reason: HOSPADM

## 2023-11-26 RX ORDER — PENICILLIN G 3000000 [IU]/50ML
3 INJECTION, SOLUTION INTRAVENOUS EVERY 4 HOURS
Status: DISCONTINUED | OUTPATIENT
Start: 2023-11-26 | End: 2023-11-26 | Stop reason: HOSPADM

## 2023-11-26 RX ORDER — NALOXONE HYDROCHLORIDE 0.4 MG/ML
0.2 INJECTION, SOLUTION INTRAMUSCULAR; INTRAVENOUS; SUBCUTANEOUS
Status: DISCONTINUED | OUTPATIENT
Start: 2023-11-26 | End: 2023-11-26 | Stop reason: HOSPADM

## 2023-11-26 RX ORDER — ONDANSETRON 4 MG/1
4 TABLET, ORALLY DISINTEGRATING ORAL EVERY 6 HOURS PRN
Status: DISCONTINUED | OUTPATIENT
Start: 2023-11-26 | End: 2023-11-26 | Stop reason: HOSPADM

## 2023-11-26 RX ORDER — MISOPROSTOL 200 UG/1
800 TABLET ORAL
Status: DISCONTINUED | OUTPATIENT
Start: 2023-11-26 | End: 2023-11-26 | Stop reason: HOSPADM

## 2023-11-26 RX ORDER — CARBOPROST TROMETHAMINE 250 UG/ML
250 INJECTION, SOLUTION INTRAMUSCULAR
Status: DISCONTINUED | OUTPATIENT
Start: 2023-11-26 | End: 2023-11-28 | Stop reason: HOSPADM

## 2023-11-26 RX ORDER — IBUPROFEN 800 MG/1
800 TABLET, FILM COATED ORAL
Status: DISCONTINUED | OUTPATIENT
Start: 2023-11-26 | End: 2023-11-28 | Stop reason: HOSPADM

## 2023-11-26 RX ORDER — CARBOPROST TROMETHAMINE 250 UG/ML
250 INJECTION, SOLUTION INTRAMUSCULAR
Status: DISCONTINUED | OUTPATIENT
Start: 2023-11-26 | End: 2023-11-26 | Stop reason: HOSPADM

## 2023-11-26 RX ORDER — OXYTOCIN/0.9 % SODIUM CHLORIDE 30/500 ML
340 PLASTIC BAG, INJECTION (ML) INTRAVENOUS CONTINUOUS PRN
Status: DISCONTINUED | OUTPATIENT
Start: 2023-11-26 | End: 2023-11-26 | Stop reason: HOSPADM

## 2023-11-26 RX ORDER — OXYTOCIN/0.9 % SODIUM CHLORIDE 30/500 ML
340 PLASTIC BAG, INJECTION (ML) INTRAVENOUS CONTINUOUS PRN
Status: DISCONTINUED | OUTPATIENT
Start: 2023-11-26 | End: 2023-11-28 | Stop reason: HOSPADM

## 2023-11-26 RX ORDER — MODIFIED LANOLIN
OINTMENT (GRAM) TOPICAL
Status: DISCONTINUED | OUTPATIENT
Start: 2023-11-26 | End: 2023-11-28 | Stop reason: HOSPADM

## 2023-11-26 RX ORDER — DOCUSATE SODIUM 100 MG/1
100 CAPSULE, LIQUID FILLED ORAL DAILY
Status: DISCONTINUED | OUTPATIENT
Start: 2023-11-26 | End: 2023-11-28 | Stop reason: HOSPADM

## 2023-11-26 RX ORDER — MISOPROSTOL 200 UG/1
800 TABLET ORAL
Status: DISCONTINUED | OUTPATIENT
Start: 2023-11-26 | End: 2023-11-28 | Stop reason: HOSPADM

## 2023-11-26 RX ORDER — KETOROLAC TROMETHAMINE 30 MG/ML
30 INJECTION, SOLUTION INTRAMUSCULAR; INTRAVENOUS
Status: DISCONTINUED | OUTPATIENT
Start: 2023-11-26 | End: 2023-11-28 | Stop reason: HOSPADM

## 2023-11-26 RX ORDER — PROCHLORPERAZINE MALEATE 10 MG
10 TABLET ORAL EVERY 6 HOURS PRN
Status: DISCONTINUED | OUTPATIENT
Start: 2023-11-26 | End: 2023-11-26 | Stop reason: HOSPADM

## 2023-11-26 RX ORDER — IBUPROFEN 800 MG/1
800 TABLET, FILM COATED ORAL EVERY 6 HOURS PRN
Status: DISCONTINUED | OUTPATIENT
Start: 2023-11-26 | End: 2023-11-28 | Stop reason: HOSPADM

## 2023-11-26 RX ORDER — METHYLERGONOVINE MALEATE 0.2 MG/ML
200 INJECTION INTRAVENOUS
Status: DISCONTINUED | OUTPATIENT
Start: 2023-11-26 | End: 2023-11-26 | Stop reason: HOSPADM

## 2023-11-26 RX ORDER — ACETAMINOPHEN 325 MG/1
650 TABLET ORAL EVERY 4 HOURS PRN
Status: DISCONTINUED | OUTPATIENT
Start: 2023-11-26 | End: 2023-11-28 | Stop reason: HOSPADM

## 2023-11-26 RX ORDER — MISOPROSTOL 200 UG/1
400 TABLET ORAL
Status: DISCONTINUED | OUTPATIENT
Start: 2023-11-26 | End: 2023-11-28 | Stop reason: HOSPADM

## 2023-11-26 RX ORDER — TRANEXAMIC ACID 10 MG/ML
1 INJECTION, SOLUTION INTRAVENOUS EVERY 30 MIN PRN
Status: DISCONTINUED | OUTPATIENT
Start: 2023-11-26 | End: 2023-11-26 | Stop reason: HOSPADM

## 2023-11-26 RX ORDER — METOCLOPRAMIDE HYDROCHLORIDE 5 MG/ML
10 INJECTION INTRAMUSCULAR; INTRAVENOUS EVERY 6 HOURS PRN
Status: DISCONTINUED | OUTPATIENT
Start: 2023-11-26 | End: 2023-11-26 | Stop reason: HOSPADM

## 2023-11-26 RX ORDER — NALOXONE HYDROCHLORIDE 0.4 MG/ML
0.4 INJECTION, SOLUTION INTRAMUSCULAR; INTRAVENOUS; SUBCUTANEOUS
Status: DISCONTINUED | OUTPATIENT
Start: 2023-11-26 | End: 2023-11-26 | Stop reason: HOSPADM

## 2023-11-26 RX ORDER — ACETAMINOPHEN 325 MG/1
650 TABLET ORAL EVERY 4 HOURS PRN
Status: DISCONTINUED | OUTPATIENT
Start: 2023-11-26 | End: 2023-11-26 | Stop reason: HOSPADM

## 2023-11-26 RX ORDER — OXYTOCIN/0.9 % SODIUM CHLORIDE 30/500 ML
100-340 PLASTIC BAG, INJECTION (ML) INTRAVENOUS CONTINUOUS PRN
Status: DISCONTINUED | OUTPATIENT
Start: 2023-11-26 | End: 2023-11-28 | Stop reason: HOSPADM

## 2023-11-26 RX ORDER — OXYTOCIN 10 [USP'U]/ML
10 INJECTION, SOLUTION INTRAMUSCULAR; INTRAVENOUS
Status: DISCONTINUED | OUTPATIENT
Start: 2023-11-26 | End: 2023-11-28 | Stop reason: HOSPADM

## 2023-11-26 RX ORDER — METHYLERGONOVINE MALEATE 0.2 MG/ML
200 INJECTION INTRAVENOUS
Status: DISCONTINUED | OUTPATIENT
Start: 2023-11-26 | End: 2023-11-28 | Stop reason: HOSPADM

## 2023-11-26 RX ORDER — CITRIC ACID/SODIUM CITRATE 334-500MG
30 SOLUTION, ORAL ORAL
Status: DISCONTINUED | OUTPATIENT
Start: 2023-11-26 | End: 2023-11-26 | Stop reason: HOSPADM

## 2023-11-26 RX ORDER — FENTANYL CITRATE 50 UG/ML
50 INJECTION, SOLUTION INTRAMUSCULAR; INTRAVENOUS EVERY 30 MIN PRN
Status: DISCONTINUED | OUTPATIENT
Start: 2023-11-26 | End: 2023-11-26 | Stop reason: HOSPADM

## 2023-11-26 RX ORDER — ONDANSETRON 2 MG/ML
4 INJECTION INTRAMUSCULAR; INTRAVENOUS EVERY 6 HOURS PRN
Status: DISCONTINUED | OUTPATIENT
Start: 2023-11-26 | End: 2023-11-26 | Stop reason: HOSPADM

## 2023-11-26 RX ORDER — PROCHLORPERAZINE 25 MG
25 SUPPOSITORY, RECTAL RECTAL EVERY 12 HOURS PRN
Status: DISCONTINUED | OUTPATIENT
Start: 2023-11-26 | End: 2023-11-26 | Stop reason: HOSPADM

## 2023-11-26 RX ORDER — METOCLOPRAMIDE 10 MG/1
10 TABLET ORAL EVERY 6 HOURS PRN
Status: DISCONTINUED | OUTPATIENT
Start: 2023-11-26 | End: 2023-11-26 | Stop reason: HOSPADM

## 2023-11-26 RX ORDER — BISACODYL 10 MG
10 SUPPOSITORY, RECTAL RECTAL DAILY PRN
Status: DISCONTINUED | OUTPATIENT
Start: 2023-11-26 | End: 2023-11-28 | Stop reason: HOSPADM

## 2023-11-26 RX ORDER — MISOPROSTOL 200 UG/1
400 TABLET ORAL
Status: DISCONTINUED | OUTPATIENT
Start: 2023-11-26 | End: 2023-11-26 | Stop reason: HOSPADM

## 2023-11-26 RX ORDER — OXYTOCIN 10 [USP'U]/ML
10 INJECTION, SOLUTION INTRAMUSCULAR; INTRAVENOUS
Status: DISCONTINUED | OUTPATIENT
Start: 2023-11-26 | End: 2023-11-26 | Stop reason: HOSPADM

## 2023-11-26 RX ADMIN — WITCH HAZEL: 500 SOLUTION RECTAL; TOPICAL at 22:22

## 2023-11-26 RX ADMIN — IBUPROFEN 800 MG: 800 TABLET ORAL at 22:21

## 2023-11-26 RX ADMIN — Medication 340 ML/HR: at 09:28

## 2023-11-26 RX ADMIN — PENICILLIN G POTASSIUM 5 MILLION UNITS: 5000000 POWDER, FOR SOLUTION INTRAMUSCULAR; INTRAPLEURAL; INTRATHECAL; INTRAVENOUS at 08:51

## 2023-11-26 RX ADMIN — ACETAMINOPHEN 650 MG: 325 TABLET ORAL at 11:05

## 2023-11-26 RX ADMIN — KETOROLAC TROMETHAMINE 30 MG: 30 INJECTION, SOLUTION INTRAMUSCULAR; INTRAVENOUS at 10:13

## 2023-11-26 RX ADMIN — WITCH HAZEL: 500 SOLUTION RECTAL; TOPICAL at 19:47

## 2023-11-26 RX ADMIN — IBUPROFEN 800 MG: 800 TABLET ORAL at 16:07

## 2023-11-26 RX ADMIN — BENZOCAINE AND LEVOMENTHOL: 200; 5 SPRAY TOPICAL at 19:47

## 2023-11-26 RX ADMIN — DOCUSATE SODIUM 100 MG: 100 CAPSULE, LIQUID FILLED ORAL at 11:05

## 2023-11-26 RX ADMIN — BENZOCAINE AND LEVOMENTHOL: 200; 5 SPRAY TOPICAL at 22:22

## 2023-11-26 RX ADMIN — ACETAMINOPHEN 650 MG: 325 TABLET ORAL at 16:06

## 2023-11-26 RX ADMIN — ACETAMINOPHEN 650 MG: 325 TABLET ORAL at 19:58

## 2023-11-26 ASSESSMENT — ACTIVITIES OF DAILY LIVING (ADL)
ADLS_ACUITY_SCORE: 22
ADLS_ACUITY_SCORE: 18
ADLS_ACUITY_SCORE: 18
FALL_HISTORY_WITHIN_LAST_SIX_MONTHS: NO
WEAR_GLASSES_OR_BLIND: NO
DRESSING/BATHING_DIFFICULTY: NO
DIFFICULTY_COMMUNICATING: NO
CONCENTRATING,_REMEMBERING_OR_MAKING_DECISIONS_DIFFICULTY: NO
ADLS_ACUITY_SCORE: 18
CHANGE_IN_FUNCTIONAL_STATUS_SINCE_ONSET_OF_CURRENT_ILLNESS/INJURY: NO
ADLS_ACUITY_SCORE: 18
ADLS_ACUITY_SCORE: 18
HEARING_DIFFICULTY_OR_DEAF: NO
TOILETING_ISSUES: NO
DIFFICULTY_EATING/SWALLOWING: NO
WALKING_OR_CLIMBING_STAIRS_DIFFICULTY: NO
DOING_ERRANDS_INDEPENDENTLY_DIFFICULTY: NO
ADLS_ACUITY_SCORE: 18
ADLS_ACUITY_SCORE: 22

## 2023-11-26 NOTE — L&D DELIVERY NOTE
Delivery Summary  [facility]  Date of Service: 2023    Name      Christiano Renae         1995  MRN       [id number]  PCP        Dr. Erica Umaña at [facility].    Pre-delivery Diagnoses:  Intrauterine pregnancy at 38w2d    Group B strep carrier  Fetal pelviectasis - mild left  Mom received RSV immunization 23 (13 days prior to delivery)    Post-delivery Diagnoses:  Normal Spontaneous Vaginal Delivery at 38w2d EGA  Delivery of a male infant        [facility] Weight: 6 lb 14.4 oz        Apgar scores: 7,9    Labor Complications: None    Delivery Type:     Narrative:  On 2023, Christiano Renae, a 28 year old , delivered a viable male infant at 38w2d with apgars of 7,9 via .    [facility]. On admission, she was 5 cm dilated, and jef regularly. She reports leaking clear fluid since 23:30 on 23.    Her group B Strep (GBS) carrier status was positive. She received 1 partial intrapartum dose of IV penicillin. She received nothing for induction/augmentation. She received nothing for analgesia.    Delivery was via normal spontaneous delivery to a sterile field under no anesthesia. Infant delivered in ERIKA position. Shoulders delivered without difficulty. The baby was placed on the patient's abdomen. Cord complications: None. Delayed cord clamping was performed.     Placenta delivered at 09:31. Placenta intact and normal appearance. Umbilical cord was short (~30cm) and three vessels were noted. Fundal massage performed and fundus found to be firm. The following uterotonics were given: Pitocin (IV). Perineum, vagina, cervix were inspected, and the following lacerations were noted: None. Minimal amniotic fluid noted in labor and delivery. QBL: 82 mL.    Excellent hemostasis was noted. Needle and sponge count correct. Patient in delivery room in good and stable condition. Infant had two  large voids following birth. Infant developed respiratory suppression at 10 minutes of life and was transferred to NICU for evaluation and treatment.  _________________    GA: 38w2d  GP:   Labor Complications: None  QBL: 82 mL  Delivery Type:   Duration of Ruptured Membranes: 9h 56m   Weight:  Weight: 6 lb 14.4 oz (3130 g)  Apgar scores: 7,9.     Paw, Male-Mu [[id number]]      Labor Event Times      Latent labor onset date/time: 2023 0700    Active labor onset date: 23 Onset time:  8:30 AM   Dilation complete date: 23 Complete time:  9:23 AM   Start pushing date/time: 2023 0923          Labor Events     labor?: No   steroids: None  Labor Type: Spontaneous  Predominate monitoring during 1st stage: continuous electronic fetal monitoring     Antibiotics received during labor?: Yes  Reason for Antibiotics: GBS  Antibiotics received for GBS: Penicillin  Antibiotics Given (GBS): Less than or equal to 4 hours prior to delivery     Rupture identifier: Sac 1  Rupture date/time: 23 2330   Rupture type: Spontaneous Rupture of Membranes  Fluid color: Clear     Augmentation: None       Delivery/Placenta Date and Time      Delivery Date: 23 Delivery Time:  9:26 AM   Placenta Date/Time: 2023  9:31 AM  Oxytocin given at the time of delivery: after delivery of baby  Delivering clinician: Hiral Diamond MD              Vaginal Counts       Initial count performed by 2 team members:  Two Team Members   AB Lama Dr.         [place] Suture Needles Sponges (RETIRED) Instruments   Initial counts   5    Added to count       Relief counts       Final counts   5            Placed during labor Accounted for at the end of labor   FSE  No   IUPC  No   Cervidil  No                  Final count performed by 2 team members:  Two Team Members   Dara      Final count correct?: Yes             Apgars    Living status: Living   1 Minute 5 Minute 10 Minute 15  Minute 20 Minute   Skin color: 0  1       Heart rate: 2  2       Reflex irritability: 2  2       Muscle tone: 2  2       Respiratory effort: 1  2       Total: 7  9       Apgars assigned by: OTTO ELLINGTON RN       Cord      Vessels: 3 Vessels    Cord Complications: None               Cord Blood Disposition: Discard    Gases Sent?: No    Delayed cord clamping?: Yes    Cord Clamping Delay (seconds):  seconds            Stem cell collection?: No           [place] Resuscitation    Methods: Suctioning       Output in Delivery Room: Voided       [place] Measurements      Weight: 6 lb 14.4 oz    Birth Comments: Short umbilical cord (~30cm), two large voids right after birth.  Output in delivery room: Voided       Skin to Skin and Feeding Plan      Skin to skin initiation date/time:       Skin to skin end date/time:     Reason skin to skin not initiated:  Acuity       Labor Events and Shoulder Dystocia    Fetal Tracing Prior to Delivery: Category 2  Fetal Tracing Comments: Variable FHR decelerations with contractions  Shoulder dystocia present?: Neg                 Delivery (Maternal) (Provider to Complete) ([id number])    Episiotomy: None      Perineal lacerations: None    Repair suture: None  Genital tract inspection done: Pos       Blood Loss  Mother: Christiano Renae Pascual #[id number]     Start of Mother's Information      Delivery Blood Loss  23 0830 - 23 1006      Delivery QBL (mL) Hospital Encounter 82 mL    Total  82 mL               End of Mother's Information  Mother: Christiano Renae Pascual #[id number]                Delivery - Provider to Complete ([id number])    Delivering clinician: Hiral Diamond MD  Delivery Type (Choose the 1 that will go to the Birth History): Vaginal, Spontaneous                                           Placenta    Date/Time: 2023  9:31 AM  Removal: Spontaneous  Disposition: Hospital disposal             Anesthesia    Method: None                    Presentation and Position    Presentation:  Vertex    Position: Left Occiput Anterior                     Completed by:   Hiral Diamond MD  [facility]  11/26/2023 9:59 AM   used: Liza

## 2023-11-26 NOTE — PROGRESS NOTES
Patient presents to [facility] with contractions that started around 0700 this morning.   She also reports some leaking of fluid last night approximately 2300.   EFM and toco applied with patient permission. EFM with moderate variability and decelerations with contractions; see labor flowsheet for details.   SVE 6/90/-1. BBOW.    Dr. Diamond notified of patient arrival and labor status. She is coming to the hospital for delivery.     Lucy Centeno RN

## 2023-11-26 NOTE — H&P
Maternal Admission H&P  [facility]  Date of Admission: 2023  Date of Service: 2023    Name      Christiano Renae         1995  MRN       [id number]  PCP        Dr. Erica Umaña at [facility].    ________________________________________________________________________    Assessment and Plan:  28 year old  at 38w2d.  Baby AGA. Anticipate .  Group B strep: positive - starting penicillin immediately  Plan fetal renal ultrasound as outpatient  ________________________________________________________________________    Chief Complaint: active labor.    HPI: Christiano Renae is a 28 year old woman at 38w2d, based on an Estimated Date of Delivery: Dec 8, 2023. She presents with active labor. Leaking fluid since 11:30PM 23. 5cm dilated and jef on admission.     Patient Active Problem List    Diagnosis Date Noted    Pregnant 2023     Priority: Medium    Language barrier affecting health care 2023     Priority: Medium    GBS (group B Streptococcus carrier), +RV culture, currently pregnant 2023     Priority: Medium    Constipation 2017     Priority: Medium      OB History    Para Term  AB Living   4 3 3 0 0 3   SAB IAB Ectopic Multiple Live Births   0 0 0 0 3      # Outcome Date GA Lbr Adeel/2nd Weight Sex Delivery Anes PTL Lv   4 Current            3 Term 21 39w5d 00:31  00:01 3.08 kg (6 lb 12.6 oz) F Vag-Spont None N MANNY      Name: ADELINA,FEMALE-MU      Apgar1: 8  Apgar5: 9   2 Term 18 39w0d  3.09 kg (6 lb 13 oz) F Vag-Spont IV N MANNY      Name: ADELINA,FEMALE-MU      Apgar1: 8  Apgar5: 8   1 Term 17 39w4d 09:55 / 00:42 3.374 kg (7 lb 7 oz) M Vag-Spont Local N MANNY      Birth Comments: none      Name: Tara Alvarado Sourav      Apgar1: 9  Apgar5: 9     Review of Systems Negative except what is noted in HPI.  Past Medical History:   Diagnosis Date    Hepatitis B immune     anti-HBs and anti-HBc positive.     Varicella     Immune per lab 7/29/16       Past Surgical History:   Procedure Laterality Date    NO PAST SURGERIES     Patient has no known allergies.  Family History   Problem Relation Age of Onset    No Known Problems Mother     No Known Problems Father     No Known Problems Sister     No Known Problems Brother     No Known Problems Son      Social History     Socioeconomic History    Marital status: Single     Spouse name: Gallo    Number of children: 4    Years of education: 10 in [employer]    Highest education level: 10th grade   Occupational History    Not on file   Tobacco Use    Smoking status: Never     Passive exposure: Yes ( smokes outside)    Smokeless tobacco: Current     Types: Chew    Tobacco comments:     Chews betel nut sometimes.    Vaping Use    Vaping Use: Never used   Substance and Sexual Activity    Alcohol use: No    Drug use: No    Sexual activity: Yes     Partners: Male     Birth control/protection: None     Comment: Planning NEXPLANON (11/23)   Other Topics Concern    Not on file   Social History Narrative    Steffany saucedo. Lives with boyfriend not yet . Arrived in [place] to [place] 6/2016. Aunt lives in [place]. Parents and 4 siblings [place] in [place].     Works as a PCA.      Social Determinants of Health     Financial Resource Strain: Low Risk  (11/20/2023)    Financial Resource Strain     Within the past 12 months, have you or your family members you live with been unable to get utilities (heat, electricity) when it was really needed?: No   Food Insecurity: Low Risk  (11/20/2023)    Food Insecurity     Within the past 12 months, did you worry that your food would run out before you got money to buy more?: No     Within the past 12 months, did the food you bought just not last and you didn t have money to get more?: No   Transportation Needs: Low Risk  (11/20/2023)    Transportation Needs     Within the past 12 months, has lack of transportation kept you from medical  appointments, getting your medicines, non-medical meetings or appointments, work, or from getting things that you need?: No   Physical Activity: Not on file   Stress: Not on file   Social Connections: Not on file   Interpersonal Safety: Low Risk  (10/2/2023)    Interpersonal Safety     Do you feel physically and emotionally safe where you currently live?: Yes     Within the past 12 months, have you been hit, slapped, kicked or otherwise physically hurt by someone?: No     Within the past 12 months, have you been humiliated or emotionally abused in other ways by your partner or ex-partner?: No   Housing Stability: Low Risk  (11/20/2023)    Housing Stability     Do you have housing? : Yes     Are you worried about losing your housing?: No     Prior to Admission Medication List  Medications Prior to Admission   Medication Sig Dispense Refill Last Dose    Prenat-Fe Poly-Methfol-FA-DHA (VITAFOL FE+) 90-0.6-0.4-200 MG CAPS Take 1 capsule by mouth daily 90 capsule 3       Allergies  No Known Allergies  Immunization History   Administered Date(s) Administered    COVID-19 Monovalent 18+ (Moderna) 04/29/2021, 05/27/2021    HPV Quadrivalent 08/26/2014, 10/27/2014, 02/27/2015    HPV9 08/09/2016, 09/07/2016, 07/06/2017    HepB, Unspecified 03/03/2016, 05/03/2016    Hepatitis A Immunity: Titer/MD Dx 07/29/2016    Hepatitis B Immunity: Titer 07/29/2016    Hepatitis B, Adult 09/19/2012, 10/22/2013, 04/02/2014    Influenza Intranasal Vaccine 10/08/2013    Influenza Vaccine >6 months,quad, PF 10/27/2014, 11/18/2015, 09/08/2020, 10/06/2021, 10/04/2022, 10/02/2023    Influenza Vaccine, 6+MO IM (QUADRIVALENT W/PRESERVATIVES) 08/29/2018    MMR 09/19/2012, 04/02/2014, 03/03/2016, 05/03/2016, 04/23/2017, 04/01/2021    RSV Vaccine (Abrysvo) 11/13/2023    TDAP (Adacel,Boostrix) 06/25/2013, 11/18/2015, 01/30/2017, 09/28/2018, 01/22/2021, 06/08/2021, 10/04/2022    Td (Adult), Adsorbed 09/19/2012, 08/26/2014, 03/03/2016, 05/03/2016    Varicella  "Immunity: Titer/MD Dx 07/29/2016      Physical Exam  Patient Vitals for the past 24 hrs:   BP Temp Temp src Resp Height Weight   11/26/23 0945 118/80 -- -- -- -- --   11/26/23 0930 120/80 -- -- -- -- --   11/26/23 0845 105/62 -- -- -- -- --   11/26/23 0840 105/62 98.2  F (36.8  C) Oral 20 1.549 m (5' 1\") 63.5 kg (140 lb)     Wt Readings from Last 1 Encounters:   11/26/23 63.5 kg (140 lb)   Prepregnancy: 54 kg (119 lb), BMI 22.50. Total gain: 9.526 kg (21 lb) (expected gain: 11.5 kg (25 lb)-16 kg (35 lb)).    HEART: RRR, no murmur  LUNGS: CTA bilaterally  Fetal Heart Tones: Baseline 145 bpm, variability moderate (6-25 bpm), variable decelerations. Reactive.  CONTRACTIONS:  regular, every 2-3 minutes.  CERVIX: dilation 8 cm per RN  FLUID: None noted.  Fetal Presentation: vertex.  Labs  Recent Results (from the past 24 hour(s))   Adult Type and Screen    Collection Time: 11/26/23  8:50 AM   Result Value Ref Range    ABO/RH(D) A POS     Antibody Screen Negative Negative    SPECIMEN EXPIRATION DATE [id number]    Hemoglobin    Collection Time: 11/26/23  8:50 AM   Result Value Ref Range    Hemoglobin 12.1 11.7 - 15.7 g/dL   Extra Green Top (Lithium Heparin) Tube    Collection Time: 11/26/23  8:50 AM   Result Value Ref Range    Hold Specimen JIC    Extra Purple Top Tube    Collection Time: 11/26/23  8:50 AM   Result Value Ref Range    Hold Specimen JIC       Group B Strep PCR   Date Value Ref Range Status   11/13/2023 Positive (A) Negative Final     Comment:     ALERT: Streptococcus agalactiae (Group B Streptococcus) has a high rate of resistance to clindamycin. Therefore, clindamycin is not recommended for treatment unless susceptibility testing has been performed.      Antibody Screen   Date Value Ref Range Status   11/26/2023 Negative Negative Final     Hepatitis B Surface Antigen   Date Value Ref Range Status   05/26/2023 Nonreactive Nonreactive Final     Neisseria gonorrhoeae   Date Value Ref Range Status "   10/09/2020 Negative Negative Final     Hemoglobin   Date Value Ref Range Status   11/26/2023 12.1 11.7 - 15.7 g/dL Final        Completed by:   Hiral Diamond MD  [facility]  11/26/2023 10:12 AM   used: Liza

## 2023-11-26 NOTE — PLAN OF CARE
Problem: Labor  Goal: Hemostasis  Outcome: Adequate for Care Transition  Goal: Stable Fetal Wellbeing  Outcome: Adequate for Care Transition  Goal: Effective Progression to Delivery  Outcome: Adequate for Care Transition  Goal: Absence of Infection Signs and Symptoms  Outcome: Adequate for Care Transition  Goal: Acceptable Pain Control  Outcome: Adequate for Care Transition  Goal: Normal Uterine Contraction Pattern  Outcome: Adequate for Care Transition   Goal Outcome Evaluation:       Delivery of viable baby boy at 0926. See delivery summary and labor flowsheet for additional details.

## 2023-11-27 LAB
HBV CORE AB SERPL QL IA: REACTIVE
HBV SURFACE AB SERPL IA-ACNC: >1000 M[IU]/ML
HBV SURFACE AB SERPL IA-ACNC: REACTIVE M[IU]/ML
HBV SURFACE AG SERPL QL IA: NONREACTIVE
HGB BLD-MCNC: 12 G/DL (ref 11.7–15.7)
T PALLIDUM AB SER QL: NONREACTIVE

## 2023-11-27 PROCEDURE — 36415 COLL VENOUS BLD VENIPUNCTURE: CPT | Performed by: FAMILY MEDICINE

## 2023-11-27 PROCEDURE — 250N000013 HC RX MED GY IP 250 OP 250 PS 637: Performed by: FAMILY MEDICINE

## 2023-11-27 PROCEDURE — 120N000001 HC R&B MED SURG/OB

## 2023-11-27 PROCEDURE — 99207 PR SUBSEQUENT HOSPITAL CARE FOR MOTHER, 15 MINUTES: CPT | Performed by: FAMILY MEDICINE

## 2023-11-27 PROCEDURE — 85018 HEMOGLOBIN: CPT | Performed by: FAMILY MEDICINE

## 2023-11-27 RX ADMIN — IBUPROFEN 800 MG: 800 TABLET ORAL at 16:47

## 2023-11-27 RX ADMIN — ACETAMINOPHEN 650 MG: 325 TABLET ORAL at 00:26

## 2023-11-27 RX ADMIN — ACETAMINOPHEN 650 MG: 325 TABLET ORAL at 13:45

## 2023-11-27 RX ADMIN — ACETAMINOPHEN 650 MG: 325 TABLET ORAL at 22:32

## 2023-11-27 RX ADMIN — IBUPROFEN 800 MG: 800 TABLET ORAL at 04:24

## 2023-11-27 RX ADMIN — IBUPROFEN 800 MG: 800 TABLET ORAL at 22:32

## 2023-11-27 RX ADMIN — ACETAMINOPHEN 650 MG: 325 TABLET ORAL at 08:56

## 2023-11-27 RX ADMIN — IBUPROFEN 800 MG: 800 TABLET ORAL at 10:26

## 2023-11-27 RX ADMIN — ACETAMINOPHEN 650 MG: 325 TABLET ORAL at 18:08

## 2023-11-27 RX ADMIN — DOCUSATE SODIUM 100 MG: 100 CAPSULE, LIQUID FILLED ORAL at 08:56

## 2023-11-27 RX ADMIN — ACETAMINOPHEN 650 MG: 325 TABLET ORAL at 04:24

## 2023-11-27 RX ADMIN — WITCH HAZEL: 500 SOLUTION RECTAL; TOPICAL at 04:26

## 2023-11-27 ASSESSMENT — ACTIVITIES OF DAILY LIVING (ADL)
ADLS_ACUITY_SCORE: 18
ADLS_ACUITY_SCORE: 18
ADLS_ACUITY_SCORE: 19
ADLS_ACUITY_SCORE: 19
ADLS_ACUITY_SCORE: 18

## 2023-11-27 NOTE — PLAN OF CARE
Problem: Postpartum (Vaginal Delivery)  Goal: Optimal Pain Control and Function  Outcome: Progressing  Intervention: Prevent or Manage Pain  Recent Flowsheet Documentation  Taken 11/27/2023 0424 by Tami Sorensen RN  Pain Management Interventions:   medication (see MAR)   heat applied   pain management plan reviewed with patient/caregiver   quiet environment facilitated   rest  Perineal Care:   medicated pads applied   perineal spray bottle/warm water use encouraged   topical anesthetic preparation applied  Taken 11/27/2023 0420 by Tami Sorensen RN  Pain Management Interventions:   medication (see MAR)   heat applied   pain management plan reviewed with patient/caregiver   quiet environment facilitated   rest  Taken 11/27/2023 0026 by Tami Sorensen RN  Pain Management Interventions:   medication (see MAR)   heat applied   pain management plan reviewed with patient/caregiver   quiet environment facilitated   rest  Taken 11/27/2023 0021 by Tami Sorensen RN  Perineal Care:   medicated pads applied   perineal spray bottle/warm water use encouraged   topical anesthetic preparation applied  Taken 11/26/2023 2000 by Tami Sorensen RN  Perineal Care:   medicated pads applied   perineal spray bottle/warm water use encouraged   topical anesthetic preparation applied  Taken 11/26/2023 1958 by Tami Sorensen RN  Pain Management Interventions:   medication (see MAR)   heat applied   pain management plan reviewed with patient/caregiver   quiet environment facilitated   rest   Goal Outcome Evaluation:  Steffany  used with all cares. VSS, headache and cramps throughout shift that are improving with Tylenol, Ibuprofen and heat. Uterus firm, scant-light bleeding. Birth certificate and mood assessment done. Pumped twice overnight. Education given on pumping Q3 hours to protect supply.

## 2023-11-27 NOTE — PROVIDER NOTIFICATION
"Christiano is complaining of being \"a little dizzy\" when she is up walking around. She also reports that taking deep breaths is more difficult when she is up moving around. Instructed patient to notify staff when she wants to get up so someone can be stand by assist.     Vital signs are within normal limits.     /68 (BP Location: Right arm, Patient Position: Sitting, Cuff Size: Adult Regular)   Pulse 86   Temp 97.9  F (36.6  C) (Oral)   Resp 20   Ht 1.549 m (5' 1\")   Wt 63.5 kg (140 lb)   LMP  (LMP Unknown)   SpO2 97%   Breastfeeding Unknown   BMI 26.45 kg/m      Lochia is normal for postpartum stage. Fundus is firm at U-1. Lung sounds clear.     Christiano has not had breakfast yet, breakfast has been ordered.     Notified Dr. Duarte of the above information. New order to check Hemoglobin placed. Dr. Duarte is going to be at the bedside later this morning.     All assessment and discussion with patient was with Steffany grimaldo over the phone. In person Steffany  will be here at 1030 am this morning.     Anjana Padgett RN on 11/27/2023 at 9:29 AM    "

## 2023-11-27 NOTE — PLAN OF CARE
Postpartum assessment within normal limits. Hgb 12.0 today. Mu is still complaining of being slightly dizzy when she is walking. She explained she has experienced this same feeling after a previous birth and it resolved within a few days. Due to dizziness, patient is stand by assist.     Soaked in sitz tub today. Linens refreshed.     Discussed MMR vaccine, Mu agrees with having this immunization before discharge.     Mu ambulated, stand by assist, over to NICU this afternoon.     Problem: Postpartum (Vaginal Delivery)  Goal: Hemostasis  Outcome: Progressing     Problem: Postpartum (Vaginal Delivery)  Goal: Absence of Infection Signs and Symptoms  Outcome: Progressing     Anjana Padgett RN on 11/27/2023 at 1:05 PM

## 2023-11-27 NOTE — PROGRESS NOTES
"Vaginal Delivery Postpartum Progress Note  Location: [facility]    Patient Name:  Christiano Renae  :      1995  MRN:      [id number]      Assessment:    Postpartum day #1  Dizziness when standing with normal vitals and normal hgb.  Suspect orthostatic hypotension and/or tension headache.  Rubella non-immune    Plan:    Continue current care.  For headache: push fluids, massage, change postiion slowly, re-evaluate tomorrow.  MMR      Subjective:  The patient feels generally well at rest, but gets posterior neck pressure and dizziness when standing, worse the longer she stands.  Had same after other baby, lasted 2-3 days and resolved.   Voiding without difficulty, lochia normal, tolerating normal diet, and passing flatus.  Pain is well controlled with current medications.  The patient has no emotional concerns.  The baby is NICU for respiratory distress; improving today with CPAP stopped just recently.  Hasn't tried bottling yet.    Scheduled Meds:   docusate sodium  100 mg Oral Daily     Continuous Infusions:   - MEDICATION INSTRUCTIONS -      - MEDICATION INSTRUCTIONS -      oxytocin in 0.9% NaCl 340 mL/hr (23 0928)    oxytocin       PRN Meds:.acetaminophen, benzocaine-menthol, bisacodyl, carboprost, hydrocortisone (Perianal), ketorolac **OR** ketorolac **OR** ibuprofen, ibuprofen, lanolin, lidocaine (buffered or not buffered), methylergonovine, misoprostol **OR** misoprostol, - MEDICATION INSTRUCTIONS -, - MEDICATION INSTRUCTIONS -, oxytocin in 0.9% NaCl, oxytocin, oxytocin, oxytocin, tranexamic acid, witch hazel-glycerin    Objective:  /68 (BP Location: Right arm, Patient Position: Sitting, Cuff Size: Adult Regular)   Pulse 86   Temp 97.9  F (36.6  C) (Oral)   Resp 20   Ht 1.549 m (5' 1\")   Wt 63.5 kg (140 lb)   LMP  (LMP Unknown)   SpO2 97%   Breastfeeding Unknown   BMI 26.45 kg/m      .  Patient Vitals for the past 24 hrs:   BP Temp Temp src Pulse Resp SpO2   23 0846 101/68 " 97.9  F (36.6  C) Oral 86 20 97 %   11/27/23 0420 90/65 97.7  F (36.5  C) Oral 74 20 98 %   11/27/23 0021 95/58 97.9  F (36.6  C) -- 75 24 97 %   11/26/23 2000 95/63 97.9  F (36.6  C) -- 83 24 98 %   11/26/23 1602 90/58 97.9  F (36.6  C) Oral 92 16 97 %   11/26/23 1259 96/58 98.2  F (36.8  C) Oral -- -- --   11/26/23 1137 96/58 -- -- -- -- --   11/26/23 1122 92/59 -- -- -- -- --   11/26/23 1107 93/59 -- -- -- -- --   11/26/23 1052 94/60 -- -- 90 -- --       General:  Awake and alert, no acute distress  Neck: Nontender, full rom  Cardiovascular:  Heart rate and rhythm regular, no murmur rub or perry  Respiratory:  Clear to ausculation bilaterally.  Abdomen:  Soft and nontender.  The uterine fundus is firm and midline, about 2 fingerbreadths below umbulicus..     Hemoglobin   Date/Time Value Ref Range Status   11/27/2023 10:13 AM 12.0 11.7 - 15.7 g/dL Final   11/26/2023 08:50 AM 12.1 11.7 - 15.7 g/dL Final   10/02/2023 02:01 PM 11.8 11.7 - 15.7 g/dL Final         Hospital Labs:  Results for orders placed or performed during the hospital encounter of 11/26/23   Treponema Abs w Reflex to RPR and Titer   Result Value Ref Range    Treponema Antibody Total Nonreactive Nonreactive   Result Value Ref Range    Hemoglobin 12.1 11.7 - 15.7 g/dL   Hepatitis B Surface Antibody   Result Value Ref Range    Hepatitis B Surface Antibody Instrument Value >1,000.00 <8.00 m[IU]/mL    Hepatitis B Surface Antibody Reactive    Hepatitis B surface antigen   Result Value Ref Range    Hepatitis B Surface Antigen Nonreactive Nonreactive   Extra Green Top (Lithium Heparin) Tube   Result Value Ref Range    Hold Specimen JIC    Extra Purple Top Tube   Result Value Ref Range    Hold Specimen JIC    Result Value Ref Range    Hemoglobin 12.0 11.7 - 15.7 g/dL   Adult Type and Screen   Result Value Ref Range    ABO/RH(D) A POS     Antibody Screen Negative Negative    SPECIMEN EXPIRATION DATE [id number]           Provider:  Dr. Slater  Gerald    Date:  2023  Time:  10:51 AM    Patient Name:  Christiano Renae  :      1995  MRN:      [id number]

## 2023-11-27 NOTE — PLAN OF CARE
Patient is managing pain with Ibuprofen and Tylenol. Postpartum assessment WNL. Still needs to complete birth certificate. Patient up to bathroom with no output. Patient bladder scanned, 302mL detected. RN requested patient try again, via , and to go in the hat. Patient reports she urinated, but went behind the hat. Patient educated on need to measure urine via , and patient reports understanding.

## 2023-11-27 NOTE — CONSULTS
NOTE COPIED FROM 'S CHART:    INITIAL SOCIAL WORK NICU ASSESSMENT      DATA:    SW met with pts mom, Christiano, in her postpartum room utilizing Steffany  on [email or web address]. Mu's RN was present toward the end of the visit.     Reason for Social Work Consult: NICU admission     Living Situation: Christiano reprots living with her , three older children and her 's parents.      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     Insurance: [facility]     Source of Financial Support: Employment. Christiano also reports being on WIC and getting SNAP benefits. Mu reports having supplies including a car seat and a package of diapers. She denies having a crib. SW offered pack and play prior to pts discharge and Mu requested to receive one and was appreciative of the offer.     Mental Health History: Mu denies any history of mental health concerns.     History of Postpartum Mood Disorders: Mu denies any history of postpartum mood concerns.     Chemical Health History: Chart reviewed and no tox screen sent on the pt or Mu.     INTERVENTION:      NIHARIKA completed chart review and collaborated with the multidisciplinary team.   Psychosocial Assessment   Introduction to NICU  role and scope of practice   Discussed NICU experience and gave NICU welcome card  Reviewed Hospital and Community Resources   Assessed Chemical Health History and Current Symptoms   Assessed Mental Health History and Current Symptoms   Identified stressors, barriers and family concerns   Provided support and active empathetic listening and validation.   Provided psychoeducation on  mood and anxiety disorders, assessed for any current symptoms or history     ASSESSMENT:      Coping: Christiano appears to be coping adequately with pts NICU admission. She was receptive to SW visit and reports that she is feeling okay post  "delivery. SW asked if any other children have needed NICU care and Mu denies this, noting this baby is the first that has needed to go to the NICU. At this point in the assessment Mu began to cry. SW provided support and validation of the difficulty of having the pt admitted to the NICU. SW encouraged Mu to be gentle with herself as she processes this delivery. Pt receptive to support.      Affect: Appropriate, calm, tearful at times     Mood: \"Okay\"     Motivation/Ability to Access Services: Mu is connected with some services. SW will continue to follow to assess needs and offer connection to others as indicated throughout the NICU stay.     Assessment of Support System:  Uncertain at this time. SW will continue to assess.     Level of engagement with SW: High     Assessment of parental risk for PMAD: Minimal but increased due to NICU admission. Brief education provided on postpartum mood concerns including when to seek help. SW encouraged close communication with her provider in the postpartum period. Mu reports understanding.     Strengths: Experienced parents, connected to community resources, prepared for the pt at home     Vulnerabilities:  NICU admission, non-English speaking     Identified Barriers: None at this time     PLAN:    SW informed Mu of plan for SW to follow and check in throughout pts NICU stay. Mu reports understanding and denies other SW questions at this time.     SW updated RN Care Coordinator of request for supplies before pts discharge.     DARREN Lackey on 11/27/2023 at 10:43 AM     "

## 2023-11-28 VITALS
TEMPERATURE: 97.5 F | BODY MASS INDEX: 25.15 KG/M2 | SYSTOLIC BLOOD PRESSURE: 101 MMHG | DIASTOLIC BLOOD PRESSURE: 67 MMHG | HEIGHT: 61 IN | RESPIRATION RATE: 16 BRPM | OXYGEN SATURATION: 98 % | HEART RATE: 72 BPM | WEIGHT: 133.2 LBS

## 2023-11-28 PROBLEM — Z34.90 PREGNANT: Status: RESOLVED | Noted: 2023-11-26 | Resolved: 2023-11-28

## 2023-11-28 PROCEDURE — 3E0234Z INTRODUCTION OF SERUM, TOXOID AND VACCINE INTO MUSCLE, PERCUTANEOUS APPROACH: ICD-10-PCS | Performed by: FAMILY MEDICINE

## 2023-11-28 PROCEDURE — 99207 PR SUBSEQUENT HOSPITAL CARE FOR MOTHER, 15 MINUTES: CPT | Performed by: FAMILY MEDICINE

## 2023-11-28 PROCEDURE — 90471 IMMUNIZATION ADMIN: CPT | Performed by: FAMILY MEDICINE

## 2023-11-28 PROCEDURE — 250N000013 HC RX MED GY IP 250 OP 250 PS 637: Performed by: FAMILY MEDICINE

## 2023-11-28 PROCEDURE — 90707 MMR VACCINE SC: CPT | Performed by: FAMILY MEDICINE

## 2023-11-28 PROCEDURE — 250N000011 HC RX IP 250 OP 636: Performed by: FAMILY MEDICINE

## 2023-11-28 RX ORDER — IBUPROFEN 600 MG/1
600 TABLET, FILM COATED ORAL EVERY 6 HOURS PRN
Qty: 30 TABLET | Refills: 0 | Status: SHIPPED | OUTPATIENT
Start: 2023-11-28 | End: 2023-12-12

## 2023-11-28 RX ORDER — ACETAMINOPHEN 325 MG/1
325-650 TABLET ORAL EVERY 6 HOURS PRN
Qty: 30 TABLET | Refills: 0 | Status: SHIPPED | OUTPATIENT
Start: 2023-11-28 | End: 2023-12-13

## 2023-11-28 RX ORDER — DOCUSATE SODIUM 100 MG/1
100 CAPSULE, LIQUID FILLED ORAL 2 TIMES DAILY PRN
Qty: 28 CAPSULE | Refills: 0 | Status: SHIPPED | OUTPATIENT
Start: 2023-11-28 | End: 2023-12-28

## 2023-11-28 RX ADMIN — IBUPROFEN 800 MG: 800 TABLET ORAL at 11:37

## 2023-11-28 RX ADMIN — ACETAMINOPHEN 650 MG: 325 TABLET ORAL at 12:18

## 2023-11-28 RX ADMIN — DOCUSATE SODIUM 100 MG: 100 CAPSULE, LIQUID FILLED ORAL at 08:12

## 2023-11-28 RX ADMIN — ACETAMINOPHEN 650 MG: 325 TABLET ORAL at 03:17

## 2023-11-28 RX ADMIN — MEASLES, MUMPS, AND RUBELLA VIRUS VACCINE LIVE 0.5 ML: 1000; 12500; 1000 INJECTION, POWDER, LYOPHILIZED, FOR SUSPENSION SUBCUTANEOUS at 11:42

## 2023-11-28 RX ADMIN — ACETAMINOPHEN 650 MG: 325 TABLET ORAL at 08:11

## 2023-11-28 RX ADMIN — IBUPROFEN 800 MG: 800 TABLET ORAL at 05:21

## 2023-11-28 ASSESSMENT — ACTIVITIES OF DAILY LIVING (ADL)
ADLS_ACUITY_SCORE: 18

## 2023-11-28 NOTE — DISCHARGE INSTRUCTIONS
You have a Home Care nurse visit planned for Wednesday 11/29. The nurse will contact you after discharge to confirm the appointment time. If you do not hear from the nurse by Wednesday morning, please call [phone]. Please do not schedule a clinic appointment on the same day as home nurse visit.      Warning Signs after Having a Baby    Keep this paper on your fridge or somewhere else where you can see it.    Call your provider if you have any of these symptoms up to 12 weeks after having your baby.    Thoughts of hurting yourself or your baby  Pain in your chest or trouble breathing  Severe headache not helped by pain medicine  Eyesight concerns (blurry vision, seeing spots or flashes of light, other changes to eyesight)  Fainting, shaking or other signs of a seizure    Call 9-1-1 if you feel that it is an emergency.     The symptoms below can happen to anyone after giving birth. They can be very serious. Call your provider if you have any of these warning signs.    My provider s phone number: _______________________    Losing too much blood (hemorrhage)    Call your provider if you soak through a pad in less than an hour or pass blood clots bigger than a golf ball. These may be signs that you are bleeding too much.    Blood clots in the legs or lungs    After you give birth, your body naturally clots its blood to help prevent blood loss. Sometimes this increased clotting can happen in other areas of the body, like the legs or lungs. This can block your blood flow and be very dangerous.     Call your provider if you:  Have a red, swollen spot on the back of your leg that is warm or painful when you touch it.   Are coughing up blood.     Infection    Call your provider if you have any of these symptoms:  Fever of 100.4 F (38 C) or higher.  Pain or redness around your stitches if you had an incision.   Any yellow, white, or green fluid coming from places where you had stitches or surgery.    Mood Problems  (postpartum depression)    Many people feel sad or have mood changes after having a baby. But for some people, these mood swings are worse.     Call your provider right away if you feel so anxious or nervous that you can't care for yourself or your baby.    Preeclampsia (high blood pressure)    Even if you didn't have high blood pressure when you were pregnant, you are at risk for the high blood pressure disease called preeclampsia. This risk can last up to 12 weeks after giving birth.     Call your provider if you have:   Pain on your right side under your rib cage  Sudden swelling in the hands and face    Remember: You know your body. If something doesn't feel right, get medical help.     For informational purposes only. Not to replace the advice of your health care provider. Copyright 2020 [facility]. All rights reserved. Clinically reviewed by Marianela Garcia, RNC-OB, MSN. Hailo [id number] - Rev 02/23.          Postpartum Vaginal Delivery Instructions    Activity     Ask family and friends for help when you need it.  Do not place anything in your vagina for 6 weeks.  You are not restricted on other activities, but take it easy for a few weeks to allow your body to recover from delivery.  You are able to do any activities you feel up to that point.  No driving until you have stopped taking your pain medications (usually two weeks after delivery).     Call your health care provider if you have any of these symptoms:     Increased pain, swelling, redness, or fluid around your stiches from an episiotomy or perineal tear.  A fever above 100.4 F (38 C) with or without chills when placing a thermometer under your tongue.  You soak a sanitary pad with blood within 1 hour, or you see blood clots larger than a golf ball.  Bleeding that lasts more than 6 weeks.  Vaginal discharge that smells bad.  Severe pain, cramping or tenderness in your lower belly area.  A need to urinate more frequently (use the toilet  more often), more urgently (use the toilet very quickly), or it burns when you urinate.  Nausea and vomiting.  Redness, swelling or pain around a vein in your leg.  Problems breastfeeding or a red or painful area on your breast.  Chest pain and cough or are gasping for air.  Problems coping with sadness, anxiety, or depression.  If you have any concerns about hurting yourself or the baby, call your provider immediately.   You have questions or concerns after you return home.     Keep your hands clean:  Always wash your hands before touching your perineal area and stitches.  This helps reduce your risk of infection.  If your hands aren't dirty, you may use an alcohol hand-rub to clean your hands. Keep your nails clean and short.

## 2023-11-28 NOTE — PROGRESS NOTES
"[facility] RN Care Coordinator Note    Christiano Renae  [id number]  1995    Chart reviewed, discharge plan discussed with patients care team, needs assessed. Patient requests home care visit, nurse visit planned for 2023, [facility] Intake updated by this writer, patient added to [facility] schedule. Follow-up post-delivery appointment planned in 2 &6 weeks at [facility].    Patient reports to have support at home and feels ready to discharge today with  \"Ulises Jimenez\" .  RN Care Coordinator will continue to follow and assist if needed with discharge plan. Iman Hart RN on 2023 at 11:08 AM  "

## 2023-11-28 NOTE — PLAN OF CARE
Problem: Adult Inpatient Plan of Care  Goal: Plan of Care Review  Description: The Plan of Care Review/Shift note should be completed every shift.  The Outcome Evaluation is a brief statement about your assessment that the patient is improving, declining, or no change.  This information will be displayed automatically on your shift  note.  Outcome: Adequate for Care Transition     Patient meets postpartum criteria to be discharged. Independent in her own cares. Educated on prescriptions, warning signs, and follow-up appointment. Home care visit scheduled for tomorrow, Wednesday 11/29.  All questions and concerns answered at this time.

## 2023-11-28 NOTE — DISCHARGE SUMMARY
Maternal Discharge Summary  [facility]  Date of Service: 2023    Name      Christiano Renae         1995  MRN       [id number]  PCP        Erica Dominique at [facility], [phone].    ________________________________________________________________________    Assessment:  Christiano Renae is a 28 year old now  s/p  at 38w2d on 23.       Discharge Plan:   Discharge to Home. Condition at Discharge:  stable  Physical activity: Regular.  Diet:  Regular.  Home care nurse: ordered for 1-2 days from now.  Lactation clinic appointment: not indicated.  Contraception plan:  Nexplanon in clinic .  Follow up with Dr. Erica Umaña  in 2 weeks and 6 weeks for routine postpartum care.  Future Appointments   Date Time Provider Department Center   2023  1:20 PM Erica Umaña MD [facility]           Medication List        Started      acetaminophen 325 MG tablet  Commonly known as: TYLENOL  325-650 mg, Oral, EVERY 6 HOURS PRN     docusate sodium 100 MG capsule  Commonly known as: COLACE  100 mg, Oral, 2 TIMES DAILY PRN     ibuprofen 600 MG tablet  Commonly known as: ADVIL/MOTRIN  600 mg, Oral, EVERY 6 HOURS PRN             ________________________________________________________________________    Admission Date:  2023  Discharge Date:  2023  Delivery Date:  @BABYSUPPRESS(DBLINK,ept,110,,1,,)@   Gestational Age at Delivery: 38w2d    Principal Diagnosis: Labor and delivery  Delivery type: @DELRECITEM(HSB,51042,,1,,)@    Principal Problem:     (normal spontaneous vaginal delivery)  Active Problems:    Language barrier affecting health care    GBS (group B Streptococcus carrier), +RV culture, currently pregnant    Renal abnormality of fetus on prenatal ultrasound      Subjective:  Patient denies headache, dizziness, dyspnea, and leg pain. Ambulating and eating.    Discharge Exam:  Patient Vitals for the past 24 hrs:   BP  Temp Temp src Pulse Resp SpO2   11/28/23 0920 101/67 97.5  F (36.4  C) Oral 72 16 98 %   11/27/23 2346 98/47 98.4  F (36.9  C) Oral 75 16 97 %   11/27/23 1550 (!) 88/65 97.5  F (36.4  C) Oral -- 18 98 %     General - alert, comfortable  Heart - RRR, no murmurs  Lungs - CTA bilaterally  Abdomen - fundus firm, nontender, below umbilicus  Extremities - trace edema  Admission on 11/26/2023   Component Date Value Ref Range Status    Treponema Antibody Total 11/26/2023 Nonreactive  Nonreactive Final    ABO/RH(D) 11/26/2023 A POS   Final    Antibody Screen 11/26/2023 Negative  Negative Final    SPECIMEN EXPIRATION DATE 11/26/2023 [id number]   Final    Hemoglobin 11/26/2023 12.1  11.7 - 15.7 g/dL Final    Hepatitis B Surface Antibody Instr* 11/26/2023 >1,000.00  <8.00 m[IU]/mL Final    Hepatitis B Surface Antibody 11/26/2023 Reactive   Final    Patient is considered to be immune to infection with hepatitis B when the value is greater than or equal to 12.00 mIU/mL.    Hepatitis B Core Antibody Total 11/26/2023 Reactive (A)  Nonreactive Final    A reactive result indicates acute, chronic or past/resolved hepatitis B infection.    Hepatitis B Surface Antigen 11/26/2023 Nonreactive  Nonreactive Final    Hold Specimen 11/26/2023 JIC   Final    Hold Specimen 11/26/2023 JI   Final    Hemoglobin 11/27/2023 12.0  11.7 - 15.7 g/dL Final      Discharge Medications:   See medication reconciliation.     Completed by:   Nohemy Duarte MD  [facility]  11/28/2023 9:41 AM   used: Liza

## 2023-11-28 NOTE — PLAN OF CARE
Mu's VSS. Pain well controlled with PRN ibuprofen and tylenol. Fundal assessment and bleeding WNL. Up and independent. She has declined pumping overnight, stating that she wants to breastfeed. She was able to visit infant in NICU overnight and breastfeed. All cares and education done using Steffany .   Problem: Adult Inpatient Plan of Care  Goal: Plan of Care Review  Description: The Plan of Care Review/Shift note should be completed every shift.  The Outcome Evaluation is a brief statement about your assessment that the patient is improving, declining, or no change.  This information will be displayed automatically on your shift  note.  Outcome: Progressing  Goal: Absence of Hospital-Acquired Illness or Injury  Outcome: Progressing  Intervention: Prevent Skin Injury  Recent Flowsheet Documentation  Taken 11/27/2023 2346 by Lynnette Ulrich RN  Body Position: position changed independently  Intervention: Prevent and Manage VTE (Venous Thromboembolism) Risk  Recent Flowsheet Documentation  Taken 11/27/2023 2346 by Lynnette Ulrich RN  VTE Prevention/Management: SCDs (sequential compression devices) off  Intervention: Prevent Infection  Recent Flowsheet Documentation  Taken 11/27/2023 2346 by Lynnette Ulrich RN  Infection Prevention:   environmental surveillance performed   hand hygiene promoted  Goal: Optimal Comfort and Wellbeing  Outcome: Progressing  Intervention: Monitor Pain and Promote Comfort  Recent Flowsheet Documentation  Taken 11/27/2023 2346 by Lynnette Ulrich RN  Pain Management Interventions: medication (see MAR)  Intervention: Provide Person-Centered Care  Recent Flowsheet Documentation  Taken 11/27/2023 2346 by Lynnette Ulrich RN  Trust Relationship/Rapport:   care explained   choices provided   empathic listening provided   questions answered   questions encouraged  Goal: Readiness for Transition of Care  Outcome: Progressing     Problem: Postpartum (Vaginal Delivery)  Goal: Successful Parent Role  Transition  Outcome: Progressing  Intervention: Support Parent Role Transition  Recent Flowsheet Documentation  Taken 11/27/2023 2346 by Lynnette Ulrich, RN  Supportive Measures:   active listening utilized   decision-making supported   goal-setting facilitated   positive reinforcement provided   self-care encouraged  Parent-Child Attachment Promotion: (enouraged patient to see baby in NICU)   interaction encouraged   parent/caregiver presence encouraged   participation in care promoted   positive reinforcement provided  Goal: Hemostasis  Outcome: Progressing  Goal: Absence of Infection Signs and Symptoms  Outcome: Progressing

## 2023-11-28 NOTE — PROGRESS NOTES
NOTE COPIED FROM 'S CHART:    Social Work NICU Follow-Up     Data: SW checked in with pts mom, Christiano, in her postpartum room utilizing in person Steffany . SW provided pack and play as discussed with Mu yesterday.     Assessment/Intervention: Mu reports that she is doing okay today. She reports understanding of the resource provided. She informs SW that she does have a car seat for the pt but it does not have the base for the car. SW discussed with her that she can check her seat to see if it can be used with a seat belt as sometimes infant seats can be used in that way. SW also discussed that it is possible to purchase just the base of the car seat or to have someone purchase one for her. Discussed that the hospital is typically not able to give out car seats. SW encouraged her to check on the options with the car seat she currently has. Mu reports understanding and denies other SW needs or questions at this time.     Plan:  SW will continue to follow and check in throughout NICU stay as needed. If pt ready for discharge in the coming days, no further SW needs anticipated.     DARREN Lackey on 2023 at 9:11 AM   swelling

## 2023-11-28 NOTE — PLAN OF CARE
Goal Outcome Evaluation: Progressing     Christiano's VSS.  Her BP runs low, but this is normal for her.  She reports feeling slightly dizzy when she gets up to walk, says it resolves when she lays down.  She says she feels steady on her feet.  She's ambulating to the bathroom and occasionally to the NICU and is otherwise resting.  Christiano is pumping, needs reminders to do so every 3 hours.  She is using 27 mm flanges.    Problem: Postpartum (Vaginal Delivery)  Goal: Successful Parent Role Transition  Outcome: Progressing  Intervention: Support Parent Role Transition  Recent Flowsheet Documentation  Taken 11/27/2023 1550 by Lisa Griffin RN  Supportive Measures:   active listening utilized   decision-making supported   positive reinforcement provided  Parent-Child Attachment Promotion: (patient encouraged to go to NICU tp see baby) other (see comments)  Goal: Hemostasis  Outcome: Progressing  Intervention: Manage Bleeding  Recent Flowsheet Documentation  Taken 11/27/2023 1550 by Lisa Griffin RN  Syncope Management: position changed slowly  Goal: Absence of Infection Signs and Symptoms  Outcome: Progressing  Intervention: Prevent or Manage Infection  Recent Flowsheet Documentation  Taken 11/27/2023 1550 by Lisa Griffin RN  Infection Management: aseptic technique maintained  Goal: Optimal Pain Control and Function  Intervention: Prevent or Manage Pain  Recent Flowsheet Documentation  Taken 11/27/2023 1808 by Lisa Griffin RN  Pain Management Interventions: medication (see MAR)  Taken 11/27/2023 1647 by Lisa Griffin RN  Pain Management Interventions: medication (see MAR)  Goal: Effective Urinary Elimination  Intervention: Monitor and Manage Urinary Retention  Recent Flowsheet Documentation  Taken 11/27/2023 1550 by Lisa Griffin RN  Urinary Elimination Promotion: frequent voiding encouraged

## 2023-12-13 ENCOUNTER — PRENATAL OFFICE VISIT (OUTPATIENT)
Dept: FAMILY MEDICINE | Facility: CLINIC | Age: 28
End: 2023-12-13
Payer: COMMERCIAL

## 2023-12-13 VITALS
WEIGHT: 124 LBS | RESPIRATION RATE: 16 BRPM | HEART RATE: 81 BPM | SYSTOLIC BLOOD PRESSURE: 100 MMHG | BODY MASS INDEX: 23.41 KG/M2 | HEIGHT: 61 IN | OXYGEN SATURATION: 98 % | DIASTOLIC BLOOD PRESSURE: 60 MMHG | TEMPERATURE: 98 F

## 2023-12-13 DIAGNOSIS — G44.209 TENSION HEADACHE: ICD-10-CM

## 2023-12-13 DIAGNOSIS — Z32.01 POSITIVE PREGNANCY TEST: ICD-10-CM

## 2023-12-13 PROCEDURE — 99207 PR POST PARTUM EXAM: CPT | Performed by: FAMILY MEDICINE

## 2023-12-13 RX ORDER — ACETAMINOPHEN 500 MG
500-1000 TABLET ORAL EVERY 6 HOURS PRN
Qty: 120 TABLET | Refills: 3 | Status: SHIPPED | OUTPATIENT
Start: 2023-12-13

## 2023-12-13 RX ORDER — IBUPROFEN 200 MG
400 TABLET ORAL EVERY 4 HOURS PRN
Qty: 120 TABLET | Refills: 11 | Status: SHIPPED | OUTPATIENT
Start: 2023-12-13 | End: 2024-04-01

## 2023-12-13 RX ORDER — DOCONEXENT, NIACINAMIDE, .ALPHA.-TOCOPHEROL ACETATE, DL-, CHOLECALCIFEROL, BETA CAROTENE, ASCORBIC ACID, THIAMINE MONONITRATE, RIBOFLAVIN, PYRIDOXINE HYDROCHLORIDE, CYANOCOBALAMIN, IRON, ZINC OXIDE, CUPRIC OXIDE, POTASSIUM IODIDE, MAGNESIUM OXIDE, FOLIC ACID, AND LEVOMEFOLATE CALCIUM 200; 15; 20; 1000; 1100; 60; 1.6; 1.8; 2.5; 25; 90; 25; 2; 150; 20; 1; .6 MG/1; MG/1; [IU]/1; [IU]/1; [IU]/1; MG/1; MG/1; MG/1; MG/1; UG/1; MG/1; MG/1; MG/1; UG/1; MG/1; MG/1; MG/1
1 CAPSULE, LIQUID FILLED ORAL DAILY
Qty: 90 CAPSULE | Refills: 3 | Status: SHIPPED | OUTPATIENT
Start: 2023-12-13

## 2023-12-13 ASSESSMENT — EDINBURGH POSTNATAL DEPRESSION SCALE (EPDS)
I HAVE LOOKED FORWARD WITH ENJOYMENT TO THINGS: AS MUCH AS I EVER DID
I HAVE BEEN ANXIOUS OR WORRIED FOR NO GOOD REASON: NO, NOT AT ALL
TOTAL SCORE: 2
I HAVE BEEN SO UNHAPPY THAT I HAVE BEEN CRYING: YES, QUITE OFTEN
THE THOUGHT OF HARMING MYSELF HAS OCCURRED TO ME: NEVER
I HAVE FELT SCARED OR PANICKY FOR NO GOOD REASON: NO, NOT AT ALL
I HAVE BEEN ABLE TO LAUGH AND SEE THE FUNNY SIDE OF THINGS: AS MUCH AS I ALWAYS COULD
I HAVE BLAMED MYSELF UNNECESSARILY WHEN THINGS WENT WRONG: NO, NEVER
I HAVE FELT SAD OR MISERABLE: NO, NOT AT ALL
THINGS HAVE BEEN GETTING ON TOP OF ME: NO, I HAVE BEEN COPING AS WELL AS EVER
I HAVE BEEN SO UNHAPPY THAT I HAVE HAD DIFFICULTY SLEEPING: NOT AT ALL

## 2023-12-13 NOTE — PROGRESS NOTES
SUBJECTIVE: Chrisitano is here for a 6-week postpartum checkup.    She has a headache and her kids are waking her up at night so she can't get good sleep. She did take 1 ibuprofen (600 mg) today and it helped a little bit.     Delivery date was 23. She had a  of a viable boy, weight 6 pounds 14 oz., complicated by respiratory stress of the baby, requiring 2 day NICU stay. Also complicated by inadequately treated GBS.  Since delivery, she has been breast feeding.  She has No signs of infection, bleeding or other complications.  We discussed contraceptions and she has chosen nexplanon- to be placed a 6 week visit.  Patient screened for postpartum depression and complaints are NEGATIVE.     EXAM:    GENERAL APPEARANCE: healthy, alert and no distress     MS: extremities normal- no gross deformities noted, no evidence of inflammation in joints, FROM in all extremities.     SKIN: no suspicious lesions or rashes     NEURO: Normal strength and tone, sensory exam grossly normal, mentation intact and speech normal     PSYCH: mentation appears normal. and affect normal/bright     LYMPHATICS: No cervical adenopathy    ASSESSMENT:   Normal postpartum exam after .    PLAN:  Return as needed or at time of next expected pap, pelvic, or breast exam.    Erica Umaña MD

## 2024-01-18 ENCOUNTER — OFFICE VISIT (OUTPATIENT)
Dept: FAMILY MEDICINE | Facility: CLINIC | Age: 29
End: 2024-01-18
Payer: COMMERCIAL

## 2024-01-18 VITALS
RESPIRATION RATE: 16 BRPM | WEIGHT: 121.04 LBS | DIASTOLIC BLOOD PRESSURE: 60 MMHG | HEIGHT: 61 IN | TEMPERATURE: 97.7 F | SYSTOLIC BLOOD PRESSURE: 88 MMHG | HEART RATE: 64 BPM | OXYGEN SATURATION: 99 % | BODY MASS INDEX: 22.85 KG/M2

## 2024-01-18 DIAGNOSIS — Z00.00 ROUTINE GENERAL MEDICAL EXAMINATION AT A HEALTH CARE FACILITY: Primary | ICD-10-CM

## 2024-01-18 DIAGNOSIS — Z30.017 INSERTION OF IMPLANTABLE SUBDERMAL CONTRACEPTIVE: ICD-10-CM

## 2024-01-18 PROCEDURE — 99395 PREV VISIT EST AGE 18-39: CPT | Mod: 25 | Performed by: FAMILY MEDICINE

## 2024-01-18 PROCEDURE — 11981 INSERTION DRUG DLVR IMPLANT: CPT | Performed by: FAMILY MEDICINE

## 2024-01-18 ASSESSMENT — ENCOUNTER SYMPTOMS
HEMATURIA: 0
NAUSEA: 0
DIARRHEA: 0
DIZZINESS: 0
WEAKNESS: 0
BREAST MASS: 0
EYE PAIN: 0
PARESTHESIAS: 0
HEADACHES: 0
FREQUENCY: 0
MYALGIAS: 0
COUGH: 0
NERVOUS/ANXIOUS: 0
CONSTIPATION: 0
DYSURIA: 0
SORE THROAT: 0
FEVER: 0
SHORTNESS OF BREATH: 0
CHILLS: 0
HEARTBURN: 0
JOINT SWELLING: 0
ARTHRALGIAS: 0
ABDOMINAL PAIN: 0
HEMATOCHEZIA: 0
PALPITATIONS: 0

## 2024-01-18 NOTE — PROGRESS NOTES
Preventive Care Visit  [facility] VINCENTAXEL Umaña MD, Family Medicine  2024       SUBJECTIVE:   Christiano is a 28 year old, presenting for the following:  Physical and Contraception (Nexplanon Insertion )        2024     1:45 PM   Additional Questions   Roomed by guanako oconnor MA   Accompanied by children     Healthy Habits:     Getting at least 3 servings of Calcium per day:  Yes    Bi-annual eye exam:  NO    Dental care twice a year:  NO    Sleep apnea or symptoms of sleep apnea:  Daytime drowsiness and Excessive snoring    Diet:  Regular (no restrictions)  Contraception  Pertinent negatives include no abdominal pain, arthralgias, chest pain, chills, congestion, coughing, fever, headaches, joint swelling, myalgias, nausea, rash, sore throat or weakness.       Today's PHQ-2 Score:       2024    12:37 PM   PHQ-2 (  [employer])   Q1: Little interest or pleasure in doing things 0   Q2: Feeling down, depressed or hopeless 0   PHQ-2 Score 0       She is tired because of her baby waking up a lot at night. She can't nap during the day because her 2 kids nap at different times.       Social History     Tobacco Use    Smoking status: Never     Passive exposure: Yes ( smokes outside)    Smokeless tobacco: Current     Types: Chew    Tobacco comments:     Chews betel nut sometimes.    Substance Use Topics    Alcohol use: No             2024    12:33 PM   Alcohol Use   Prescreen: >3 drinks/day or >7 drinks/week? No     Reviewed orders with patient.  Reviewed health maintenance and updated orders accordingly - Yes      Breast Cancer Screenin/18/2024    12:35 PM   Breast CA Risk Assessment (FHS-7)   Do you have a family history of breast, colon, or ovarian cancer? No / Unknown         Patient under 40 years of age: Routine Mammogram Screening not recommended.   Pertinent mammograms are reviewed under the imaging tab.    History of abnormal Pap smear: NO - age 30-65 PAP every 5  "years with negative HPV co-testing recommended      5/26/2023     1:48 PM 10/9/2020     2:23 PM 6/5/2017    10:38 AM   PAP / HPV   PAP Negative for Intraepithelial Lesion or Malignancy (NILM)  Negative for squamous intraepithelial lesion or malignancy  Electronically signed by Lupe Lopez CT (ASCP) on 10/12/2020 at 12:20 PM    Negative for squamous intraepithelial lesion or malignancy  Electronically signed by Kylah Baires CT (ASCP) on 6/12/2017 at  2:48 PM        Reviewed and updated as needed this visit by clinical staff   Tobacco  Allergies  Meds              Reviewed and updated as needed this visit by Provider                    Review of Systems   Constitutional:  Negative for chills and fever.   HENT:  Negative for congestion, ear pain, hearing loss and sore throat.    Eyes:  Negative for pain and visual disturbance.   Respiratory:  Negative for cough and shortness of breath.    Cardiovascular:  Negative for chest pain and palpitations.   Gastrointestinal:  Negative for abdominal pain, constipation, diarrhea and nausea.   Genitourinary:  Negative for dysuria, frequency, genital sores, hematuria, pelvic pain, urgency, vaginal bleeding and vaginal discharge.   Musculoskeletal:  Negative for arthralgias, joint swelling and myalgias.   Skin:  Negative for rash.   Neurological:  Negative for dizziness, weakness and headaches.   Psychiatric/Behavioral:  The patient is not nervous/anxious.          OBJECTIVE:   BP (!) 88/60   Pulse 64   Temp 97.7  F (36.5  C) (Oral)   Resp 16   Ht 1.549 m (5' 1\")   Wt 54.9 kg (121 lb 0.6 oz)   LMP  (LMP Unknown)   SpO2 99%   Breastfeeding Yes   BMI 22.87 kg/m     Estimated body mass index is 23.43 kg/m  as calculated from the following:    Height as of 12/13/23: 1.549 m (5' 1\").    Weight as of 12/13/23: 56.2 kg (124 lb).  Physical Exam  GENERAL: alert and no distress  EYES: Eyes grossly normal to inspection, PERRL and conjunctivae and sclerae " normal  HENT: ear canals and TM's normal, nose and mouth without ulcers or lesions  NECK: no adenopathy, no asymmetry, masses, or scars  RESP: lungs clear to auscultation - no rales, rhonchi or wheezes  CV: regular rate and rhythm, normal S1 S2, no S3 or S4, no murmur, click or rub, no peripheral edema  MS: no gross musculoskeletal defects noted, no edema  SKIN: no suspicious lesions or rashes  NEURO: Normal strength and tone, mentation intact and speech normal  PSYCH: mentation appears normal, affect normal/bright  LYMPH: no cervical, supraclavicular, axillary, or inguinal adenopathy    Diagnostic Test Results:  Labs reviewed in Epic    ASSESSMENT/PLAN:   Routine general medical examination at a health care facility: doing well. Empathized with the fatigue of having a new baby with other children in the house. Her mood is good and she does not feel depressed. Will continue to support    Insertion of implantable subdermal contraceptive: see procedure note below  - etonogestrel (NEXPLANON) subdermal implant 68 mg  - INSERTION NON-BIODEGRADABLE DRUG DELIVERY IMPLANT    nexplanon insertion    Pre-operative Diagnosis: desires contraception    Post-operative Diagnosis: s/p nexplanon insertion    Indications: contraception    Procedure Details   The risks (including infection, bleeding, pain) and benefits of the procedure were explained to the patient and written informed consent was obtained.    Pt is right handed, so the left arm was prepped.  The area was numbed with lidocaine, and the nexplanon was placed in the usual fashion. A pressure bandage was placed.   Patient tolerated procedure well.  No complications.  No ebl.     Condition:  Stable    Complications:  None    Plan:    The patient was advised to call for any problems. She was advised to use OTC analgesics as needed for mild to moderate pain.         Patient has been advised of split billing requirements and indicates understanding:  Yes      Counseling  Reviewed preventive health counseling, as reflected in patient instructions       Regular exercise       Healthy diet/nutrition        She reports that she has never smoked. She has been exposed to tobacco smoke. Her smokeless tobacco use includes chew.        Signed Electronically by: Erica Umaña MD

## 2024-03-18 ENCOUNTER — MEDICAL CORRESPONDENCE (OUTPATIENT)
Dept: HEALTH INFORMATION MANAGEMENT | Facility: CLINIC | Age: 29
End: 2024-03-18
Payer: COMMERCIAL

## 2024-03-18 ENCOUNTER — TELEPHONE (OUTPATIENT)
Dept: FAMILY MEDICINE | Facility: CLINIC | Age: 29
End: 2024-03-18
Payer: COMMERCIAL

## 2024-03-18 DIAGNOSIS — R45.851 SUICIDAL IDEATION: Primary | ICD-10-CM

## 2024-03-18 RX ORDER — SERTRALINE HYDROCHLORIDE 25 MG/1
25 TABLET, FILM COATED ORAL DAILY
Qty: 60 TABLET | Refills: 1 | Status: SHIPPED | OUTPATIENT
Start: 2024-03-18 | End: 2024-04-01
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
## 2024-04-01 ENCOUNTER — OFFICE VISIT (OUTPATIENT)
Dept: FAMILY MEDICINE | Facility: CLINIC | Age: 29
End: 2024-04-01
Payer: COMMERCIAL

## 2024-04-01 VITALS
HEIGHT: 61 IN | SYSTOLIC BLOOD PRESSURE: 121 MMHG | OXYGEN SATURATION: 97 % | WEIGHT: 126 LBS | DIASTOLIC BLOOD PRESSURE: 78 MMHG | HEART RATE: 102 BPM | RESPIRATION RATE: 16 BRPM | TEMPERATURE: 98.3 F | BODY MASS INDEX: 23.79 KG/M2

## 2024-04-01 DIAGNOSIS — B97.89 VIRAL SINUSITIS: ICD-10-CM

## 2024-04-01 DIAGNOSIS — T74.91XD DOMESTIC VIOLENCE OF ADULT, SUBSEQUENT ENCOUNTER: Primary | ICD-10-CM

## 2024-04-01 DIAGNOSIS — J32.9 VIRAL SINUSITIS: ICD-10-CM

## 2024-04-01 PROBLEM — T74.91XA DOMESTIC ABUSE OF ADULT: Status: ACTIVE | Noted: 2024-04-01

## 2024-04-01 PROBLEM — O99.820 GBS (GROUP B STREPTOCOCCUS CARRIER), +RV CULTURE, CURRENTLY PREGNANT: Status: RESOLVED | Noted: 2023-11-26 | Resolved: 2024-04-01

## 2024-04-01 PROCEDURE — 99214 OFFICE O/P EST MOD 30 MIN: CPT | Performed by: FAMILY MEDICINE

## 2024-04-01 RX ORDER — FLUTICASONE PROPIONATE 50 MCG
1 SPRAY, SUSPENSION (ML) NASAL DAILY
Qty: 16 G | Refills: 3 | Status: SHIPPED | OUTPATIENT
Start: 2024-04-01

## 2024-04-01 RX ORDER — CETIRIZINE HYDROCHLORIDE 10 MG/1
10 TABLET ORAL DAILY
Qty: 30 TABLET | Refills: 0 | Status: SHIPPED | OUTPATIENT
Start: 2024-04-01
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
## 2024-06-03 ENCOUNTER — OFFICE VISIT (OUTPATIENT)
Dept: FAMILY MEDICINE | Facility: CLINIC | Age: 29
End: 2024-06-03
Payer: COMMERCIAL

## 2024-06-03 VITALS
OXYGEN SATURATION: 99 % | BODY MASS INDEX: 21.82 KG/M2 | RESPIRATION RATE: 16 BRPM | DIASTOLIC BLOOD PRESSURE: 58 MMHG | WEIGHT: 115.56 LBS | HEART RATE: 94 BPM | TEMPERATURE: 98.2 F | SYSTOLIC BLOOD PRESSURE: 94 MMHG | HEIGHT: 61 IN

## 2024-06-03 DIAGNOSIS — R22.0 LOCALIZED SWELLING, MASS AND LUMP, HEAD: Primary | ICD-10-CM

## 2024-06-03 PROCEDURE — 99213 OFFICE O/P EST LOW 20 MIN: CPT | Performed by: FAMILY MEDICINE

## 2024-06-03 PROCEDURE — G2211 COMPLEX E/M VISIT ADD ON: HCPCS | Performed by: FAMILY MEDICINE

## 2024-06-03 NOTE — PROGRESS NOTES
"ROMEO Renae is a 28 year old female here for a lump on the left side of her head.  She first noticed this when she was 8 years old and at the time, her family took her to a doctor in the city in [facility].  They said there was nothing to be done. Now, the mass has grown.  She especially noticed it when she recently shaved her head.  It does not hurt or bother her in any way.  ?  O  BP 94/58 (BP Location: Left arm, Patient Position: Sitting, Cuff Size: Adult Regular)   Pulse 94   Temp 98.2  F (36.8  C) (Oral)   Resp 16   Ht 1.549 m (5' 1\")   Wt 52.4 kg (115 lb 9 oz)   LMP  (LMP Unknown)   SpO2 99%   BMI 21.84 kg/m     Vitals reviewed. Nursing note reviewed.  General Appearance: Pleasant and alert, in no acute distress  HEENT: mucous membranes moist. There is a golf ball-sized mass on the left side of her head, feels superficial to the skull, on the left side of the frontal lobe. It is hard on palpation.   Skin: warm, dry, intact, no rash noted  Neuro: no focal deficits, CNs II-XII normal.   Psych: mood and affect are normal.    A/P  Christiano was seen today for mass.    Diagnoses and all orders for this visit:    Localized swelling, mass and lump, head: Suspect a benign cyst since this has been going on so long.  It is very apparent and is bothering her quite a bit cosmetically.  We will get an ultrasound and neurosurgery consult.    The longitudinal plan of care for the diagnosis(es)/condition(s) as documented were addressed during this visit. Due to the added complexity in care, I will continue to support Christiano in the subsequent management and with ongoing continuity of care.      No follow-ups on file.      The entire visit was conducted through a professional . [place].  Options for treatment and follow-up care were reviewed with the patient and/or guardian. Christiano Renae and/or guardian engaged in the decision making process and verbalized understanding of the options discussed and agreed with the final " plan.    Erica Umaña MD    Answers submitted by the patient for this visit:  General Questionnaire (Submitted on 6/3/2024)  Chief Complaint: Chronic problems general questions HPI Form  What is the reason for your visit today? : lump on head

## 2024-06-04 ENCOUNTER — HOSPITAL ENCOUNTER (OUTPATIENT)
Dept: ULTRASOUND IMAGING | Facility: HOSPITAL | Age: 29
Discharge: HOME | End: 2024-06-04
Attending: FAMILY MEDICINE | Admitting: FAMILY MEDICINE
Payer: COMMERCIAL

## 2024-06-04 DIAGNOSIS — R22.0 LOCALIZED SWELLING, MASS AND LUMP, HEAD: ICD-10-CM

## 2024-06-04 PROCEDURE — 76536 US EXAM OF HEAD AND NECK: CPT

## 2024-06-05 ENCOUNTER — TELEPHONE (OUTPATIENT)
Dept: FAMILY MEDICINE | Facility: CLINIC | Age: 29
End: 2024-06-05
Payer: COMMERCIAL

## 2024-06-05 DIAGNOSIS — R22.0 LOCALIZED SWELLING, MASS AND LUMP, HEAD: Primary | ICD-10-CM

## 2024-06-05 DIAGNOSIS — D16.9 OSTEOCHONDROMA: ICD-10-CM

## 2024-06-05 NOTE — TELEPHONE ENCOUNTER
Please call pt and explain her ultrasound looks ok but I would like her to also get a CT of her head which is like an x-ray. I will order this. Please help her schedule at [facility]. THEN, please help her schedule with Neurosurgery. Referral was placed.  Thank you.     Erica Umaña MD

## 2024-06-06 NOTE — TELEPHONE ENCOUNTER
"PT head CT is scheduled on June 14 th at 8:05 arrival time. TC tried scheduling with neurosurgery but was unable to because \"reason for referral is not on scheduling guidelines and needing a different diagnosis\" . Neurosurgery  need clinic to review before appointment can be scheduled.   "

## 2024-06-14 ENCOUNTER — HOSPITAL ENCOUNTER (OUTPATIENT)
Dept: CT IMAGING | Facility: HOSPITAL | Age: 29
Discharge: HOME | End: 2024-06-14
Attending: FAMILY MEDICINE | Admitting: FAMILY MEDICINE
Payer: COMMERCIAL

## 2024-06-14 DIAGNOSIS — R22.0 LOCALIZED SWELLING, MASS AND LUMP, HEAD: ICD-10-CM

## 2024-06-14 PROCEDURE — 70450 CT HEAD/BRAIN W/O DYE: CPT

## 2024-06-14 NOTE — TELEPHONE ENCOUNTER
6/19/2024 9:00 AM (Arrive by 8:45 AM) Justin Bee MD [facility]   Msg sent to  as well.

## 2024-06-14 NOTE — TELEPHONE ENCOUNTER
Please try again to schedule with Neurosurgery. I will place a referral again since now we have a diagnosis of osteochondroma.     Erica Umaña MD

## 2024-06-24 NOTE — CONFIDENTIAL NOTE
NEUROSURGERY- NEW PREVISIT PLANNING       Record Status/Location     Referring Provider Referral Erica Umaña MD      Diagnosis Referral D16.9 (ICD-10-CM) - Osteochondroma    MRI (HEAD, NECK, SPINE) Na    CT Pacs Head 6/14/24 [facility]    X-ray Na    INJECTION Na    PHYSICAL THERAPY Na    SURGERY Na

## 2024-07-01 ENCOUNTER — PRE VISIT (OUTPATIENT)
Dept: NEUROSURGERY | Facility: CLINIC | Age: 29
End: 2024-07-01

## 2024-07-01 ENCOUNTER — OFFICE VISIT (OUTPATIENT)
Dept: NEUROSURGERY | Facility: CLINIC | Age: 29
End: 2024-07-01
Attending: FAMILY MEDICINE
Payer: COMMERCIAL

## 2024-07-01 VITALS
HEART RATE: 88 BPM | BODY MASS INDEX: 21.73 KG/M2 | OXYGEN SATURATION: 96 % | DIASTOLIC BLOOD PRESSURE: 70 MMHG | WEIGHT: 115 LBS | SYSTOLIC BLOOD PRESSURE: 100 MMHG

## 2024-07-01 DIAGNOSIS — D16.9 OSTEOCHONDROMA: ICD-10-CM

## 2024-07-01 PROCEDURE — 99205 OFFICE O/P NEW HI 60 MIN: CPT | Performed by: NEUROLOGICAL SURGERY

## 2024-07-01 ASSESSMENT — PAIN SCALES - GENERAL: PAINLEVEL: NO PAIN (0)

## 2024-07-01 NOTE — LETTER
7/1/2024      Christiano Renae  [address]  [place] [address]      Dear Colleague,    Thank you for referring your patient, Christiano Renae, to the [facility]. Please see a copy of my visit note below.    NEUROSURGERY CONSULTATION NOTE  Neurosurgery was asked to see this patient by Erica Umaña MD for evaluation of left parietal bony swelling.       CONSULTATION ASSESSMENT AND PLAN:    Ms Renae is a 28-year-old right-handed [place] speaking female who presented to the clinic today with an  for evaluation of growing left parietal swelling of approximately 17 years duration.  She denied history of trauma prior to onset of swelling.  Clinically she is asymptomatic from the swelling with no  neurological symptoms.  She is neurologically intact on examination.  There is a hard swelling approximately 4 cm in longest dimension with well-defined margins and no signs of inflammation.    I explained the CT head findings to the patient and showed her the images.  I explained to her that the swelling is likely a bony tumor and likely benign.  I discussed the management options including conservative management with watchful observation and surgical resection of the bony tumor for pathological diagnosis.  I also discussed the differential diagnosis of the tumor.  Of these I would recommend resection of the left parietal bony tumor.    I briefly discussed the risk and benefits of all the management options.  I discussed the risk of surgery including bleeding, infection, need for repeat surgery, need for cranioplasty, DVT/PE, MI, pneumonia and others.    Patient is not sure if she is willing to pursue any surgical management at this point.  I explained to the patient that we will get MRI brain with and without contrast in the interim.  She can give us a call if she would like to proceed with a surgical resection.    Patient agreed with the plan.  All the questions were answered and patient  sounded understanding.  She can contact us if there are any further questions or concerns or worsening neurological deficits.I spent more than 60 minutes in this apt, examining the pt, reviewing the scans, reviewing notes from chart, discussing treatment options with risks and benefits and coordinating care. This note was created in part by the use of Dragon voice recognition system. Inadvertent grammatical errors and typographical errors may have occurred due to inherent limitation of voice recognition software.  Reasonable attempts made to avoid errors, but this document may contain an error not identified before finalizing.  Please contact me for any clarification needed.     Sean Muller MD      HPI:    Ms Renae is a 28-year-old right-handed [place] speaking female who presented to the clinic today with an  for evaluation of growing left parietal swelling of approximately 17 years duration.    Patient started noticing swelling involving her left temporoparietal region at the age of 11 years which has gradually increased in size over the last 17 years or so.  She denies any prior trauma.      She denies any headaches associated with the swelling, nausea or vomiting, redness or discharge.  She denies any new onset neurological symptoms including seizures.    She denies smoking however chew betel nut.  She denies use of alcohol or recreational drugs.    She denies any other significant cardiac or pulmonary history.  She is not on any antiplatelets or anticoagulants.    Past Medical History:   Diagnosis Date     Hepatitis B immune     anti-HBs and anti-HBc positive.     Varicella     Immune per lab 7/29/16        Past Surgical History:   Procedure Laterality Date     NO PAST SURGERIES         REVIEW OF SYSTEMS:  Review Of Systems  Skin: negative  Eyes: negative  Ears/Nose/Throat: negative  Respiratory: No shortness of breath, dyspnea on exertion, cough, or hemoptysis  Cardiovascular:  negative  Gastrointestinal: negative  Genitourinary: negative  Musculoskeletal: negative  Neurologic: negative  Psychiatric: negative  Hematologic/Lymphatic/Immunologic: negative  Endocrine: negative    MEDICATIONS:    Current Outpatient Medications   Medication Sig Dispense Refill     acetaminophen (TYLENOL) 500 MG tablet Take 1-2 tablets (500-1,000 mg) by mouth every 6 hours as needed for mild pain 120 tablet 3     cetirizine (ZYRTEC) 10 MG tablet Take 1 tablet (10 mg) by mouth daily 30 tablet 0     etonogestrel (NEXPLANON) 68 MG IMPL 1 each by Subdermal route once       fluticasone (FLONASE) 50 MCG/ACT nasal spray Spray 1 spray into both nostrils daily 16 g 3     Prenat-Fe Poly-Methfol-FA-DHA (VITAFOL FE+) 90-0.6-0.4-200 MG CAPS Take 1 capsule by mouth daily 90 capsule 3         ALLERGIES/SENSITIVITIES:     No Known Allergies    PERTINENT SOCIAL HISTORY: Non-smoker  Social History     Socioeconomic History     Marital status: Single     Spouse name: Gallo     Number of children: 4     Years of education: 10 in [employer]     Highest education level: 10th grade   Tobacco Use     Smoking status: Never     Passive exposure: Yes ( smokes outside)     Smokeless tobacco: Current     Types: Chew     Tobacco comments:     Chews betel nut sometimes.    Vaping Use     Vaping status: Never Used   Substance and Sexual Activity     Alcohol use: No     Drug use: No     Sexual activity: Yes     Partners: Male     Birth control/protection: None     Comment: Planning NEXPLANON (11/23)   Social History Narrative    [place] refugee. Lives with boyfriend not yet . Arrived in [facility] [place] 6/2016. Aunt lives in [place]. Parents and 4 siblings [place] in [place].     Works as a PCA.      Social Determinants of Health     Financial Resource Strain: Low Risk  (1/18/2024)    Financial Resource Strain      Within the past 12 months, have you or your family members you live with been unable to get utilities (heat, electricity)  when it was really needed?: No   Food Insecurity: Low Risk  (1/18/2024)    Food Insecurity      Within the past 12 months, did you worry that your food would run out before you got money to buy more?: No      Within the past 12 months, did the food you bought just not last and you didn t have money to get more?: No   Transportation Needs: Low Risk  (1/18/2024)    Transportation Needs      Within the past 12 months, has lack of transportation kept you from medical appointments, getting your medicines, non-medical meetings or appointments, work, or from getting things that you need?: No   Interpersonal Safety: Low Risk  (6/3/2024)    Interpersonal Safety      Do you feel physically and emotionally safe where you currently live?: Yes      Within the past 12 months, have you been hit, slapped, kicked or otherwise physically hurt by someone?: No      Within the past 12 months, have you been humiliated or emotionally abused in other ways by your partner or ex-partner?: No   Housing Stability: Low Risk  (1/18/2024)    Housing Stability      Do you have housing? : Yes      Are you worried about losing your housing?: No         FAMILY HISTORY:  Family History   Problem Relation Age of Onset     No Known Problems Mother      No Known Problems Father      No Known Problems Sister      No Known Problems Brother      No Known Problems Son         PHYSICAL EXAM:   Constitutional: /70   Pulse 88   Wt 115 lb (52.2 kg)   LMP  (LMP Unknown)   SpO2 96%   BMI 21.73 kg/m       Mental Status: A & O in no acute distress.  Affect is appropriate.  Speech is fluent.  Recent and remote memory are intact.  Attention span and concentration are normal.        Cranial Nerves:   CN1: grossly intact per patient recall.   CN2: No funduscopic exam performed.   CN3,4 & 6: Pupillary light response, lateral and vertical gaze normal.  No nystagmus.  Visual fields are full to confrontation.   CN5: Intact to touch   CN7: No facial weakness,  smile, facial symmetry intact.   CN8: Intact to spoken voice.   CN9&10: Gag reflex, uvula midline, palate rises with phonation.   CN11: Shoulder shrug 5/5 intact bilaterally.   CN12: Tongue midline and moves freely from side to side.     Motor: No pronator drift of upper extremity.   Normal bulk and tone all muscle groups of upper and lower extremities.       Delt Bi Tri Hand Flex/  Ext Iliopsoas Quadriceps Tibialis Anterior EHL Gastroc     C5 C6 C7 C8/T1 L2 L3 L4 L5 S1   R 5 5 5 5 5 5 5 5 5   L 5 5 5 5 5 5 5 5 5     Sensory: Sensation intact bilaterally to light touch.     Coordination; finger to nose,  rapid alternating movements smooth and rhythmic.   Romberg intact.   Heel/toe/tandem gait intact.    Normal gait and station.     Reflexes;                       Right              Left  Brachioradialis (C5,6)      2+                 2+  Biceps   (C5,6)                 2+                 2+  Triceps  (C7,8)                 2+                2+  Knee (L3,4)                      2+                2+  Ankle jerk (S1,2)              1+                1+      No hoffmans/babinski/ clonus.    Local examination: There is approximately 4 cm hard swelling with well-defined margins with no signs of inflammation.  Fluctuation negative    IMAGING:  I personally reviewed all radiographic images and agree with the radiology report of left parietal bony lesion measuring 3.9 x 4 x 1.9 cm likely osteochondroma involving the left parietal bone.    6/14/2024 CT head without contrast:  IMPRESSION:  1.  Superficial osteochondroma arising from the lateral aspect of the left parietal bone. No associated soft tissue component.  2.  No acute intracranial pathology.           Cc:   Erica Umaña                Again, thank you for allowing me to participate in the care of your patient.        Sincerely,        Sean Muller MD

## 2024-07-01 NOTE — PROGRESS NOTES
NEUROSURGERY CONSULTATION NOTE  Neurosurgery was asked to see this patient by Erica Umaña MD for evaluation of left parietal bony swelling.       CONSULTATION ASSESSMENT AND PLAN:    Ms Renae is a 28-year-old right-handed [place] speaking female who presented to the clinic today with an  for evaluation of growing left parietal swelling of approximately 17 years duration.  She denied history of trauma prior to onset of swelling.  Clinically she is asymptomatic from the swelling with no  neurological symptoms.  She is neurologically intact on examination.  There is a hard swelling approximately 4 cm in longest dimension with well-defined margins and no signs of inflammation.    I explained the CT head findings to the patient and showed her the images.  I explained to her that the swelling is likely a bony tumor and likely benign.  I discussed the management options including conservative management with watchful observation and surgical resection of the bony tumor for pathological diagnosis.  I also discussed the differential diagnosis of the tumor.  Of these I would recommend resection of the left parietal bony tumor.    I briefly discussed the risk and benefits of all the management options.  I discussed the risk of surgery including bleeding, infection, need for repeat surgery, need for cranioplasty, DVT/PE, MI, pneumonia and others.    Patient is not sure if she is willing to pursue any surgical management at this point.  I explained to the patient that we will get MRI brain with and without contrast in the interim.  She can give us a call if she would like to proceed with a surgical resection.    Patient agreed with the plan.  All the questions were answered and patient sounded understanding.  She can contact us if there are any further questions or concerns or worsening neurological deficits.I spent more than 60 minutes in this apt, examining the pt, reviewing the scans, reviewing notes from chart,  discussing treatment options with risks and benefits and coordinating care. This note was created in part by the use of Dragon voice recognition system. Inadvertent grammatical errors and typographical errors may have occurred due to inherent limitation of voice recognition software.  Reasonable attempts made to avoid errors, but this document may contain an error not identified before finalizing.  Please contact me for any clarification needed.     Sean Muller MD      HPI:    Ms Renae is a 28-year-old right-handed [place] speaking female who presented to the clinic today with an  for evaluation of growing left parietal swelling of approximately 17 years duration.    Patient started noticing swelling involving her left temporoparietal region at the age of 11 years which has gradually increased in size over the last 17 years or so.  She denies any prior trauma.      She denies any headaches associated with the swelling, nausea or vomiting, redness or discharge.  She denies any new onset neurological symptoms including seizures.    She denies smoking however chew betel nut.  She denies use of alcohol or recreational drugs.    She denies any other significant cardiac or pulmonary history.  She is not on any antiplatelets or anticoagulants.    Past Medical History:   Diagnosis Date    Hepatitis B immune     anti-HBs and anti-HBc positive.    Varicella     Immune per lab 7/29/16        Past Surgical History:   Procedure Laterality Date    NO PAST SURGERIES         REVIEW OF SYSTEMS:  Review Of Systems  Skin: negative  Eyes: negative  Ears/Nose/Throat: negative  Respiratory: No shortness of breath, dyspnea on exertion, cough, or hemoptysis  Cardiovascular: negative  Gastrointestinal: negative  Genitourinary: negative  Musculoskeletal: negative  Neurologic: negative  Psychiatric: negative  Hematologic/Lymphatic/Immunologic: negative  Endocrine: negative    MEDICATIONS:    Current Outpatient Medications    Medication Sig Dispense Refill    acetaminophen (TYLENOL) 500 MG tablet Take 1-2 tablets (500-1,000 mg) by mouth every 6 hours as needed for mild pain 120 tablet 3    cetirizine (ZYRTEC) 10 MG tablet Take 1 tablet (10 mg) by mouth daily 30 tablet 0    etonogestrel (NEXPLANON) 68 MG IMPL 1 each by Subdermal route once      fluticasone (FLONASE) 50 MCG/ACT nasal spray Spray 1 spray into both nostrils daily 16 g 3    Prenat-Fe Poly-Methfol-FA-DHA (VITAFOL FE+) 90-0.6-0.4-200 MG CAPS Take 1 capsule by mouth daily 90 capsule 3         ALLERGIES/SENSITIVITIES:     No Known Allergies    PERTINENT SOCIAL HISTORY: Non-smoker  Social History     Socioeconomic History    Marital status: Single     Spouse name: Gallo    Number of children: 4    Years of education: 10 in [facility] [place]    Highest education level: 10th grade   Tobacco Use    Smoking status: Never     Passive exposure: Yes ( smokes outside)    Smokeless tobacco: Current     Types: Chew    Tobacco comments:     Chews betel nut sometimes.    Vaping Use    Vaping status: Never Used   Substance and Sexual Activity    Alcohol use: No    Drug use: No    Sexual activity: Yes     Partners: Male     Birth control/protection: None     Comment: Planning NEXPLANON (11/23)   Social History Narrative    [place] refugee. Lives with boyfriend not yet . Arrived [place] [facility] [place] 6/2016. Aunt lives in [place]. Parents and 4 siblings [place].     Works as a PCA.      Social Determinants of Health     Financial Resource Strain: Low Risk  (1/18/2024)    Financial Resource Strain     Within the past 12 months, have you or your family members you live with been unable to get utilities (heat, electricity) when it was really needed?: No   Food Insecurity: Low Risk  (1/18/2024)    Food Insecurity     Within the past 12 months, did you worry that your food would run out before you got money to buy more?: No     Within the past 12 months, did the food you bought just not  last and you didn t have money to get more?: No   Transportation Needs: Low Risk  (1/18/2024)    Transportation Needs     Within the past 12 months, has lack of transportation kept you from medical appointments, getting your medicines, non-medical meetings or appointments, work, or from getting things that you need?: No   Interpersonal Safety: Low Risk  (6/3/2024)    Interpersonal Safety     Do you feel physically and emotionally safe where you currently live?: Yes     Within the past 12 months, have you been hit, slapped, kicked or otherwise physically hurt by someone?: No     Within the past 12 months, have you been humiliated or emotionally abused in other ways by your partner or ex-partner?: No   Housing Stability: Low Risk  (1/18/2024)    Housing Stability     Do you have housing? : Yes     Are you worried about losing your housing?: No         FAMILY HISTORY:  Family History   Problem Relation Age of Onset    No Known Problems Mother     No Known Problems Father     No Known Problems Sister     No Known Problems Brother     No Known Problems Son         PHYSICAL EXAM:   Constitutional: /70   Pulse 88   Wt 115 lb (52.2 kg)   LMP  (LMP Unknown)   SpO2 96%   BMI 21.73 kg/m       Mental Status: A & O in no acute distress.  Affect is appropriate.  Speech is fluent.  Recent and remote memory are intact.  Attention span and concentration are normal.        Cranial Nerves:   CN1: grossly intact per patient recall.   CN2: No funduscopic exam performed.   CN3,4 & 6: Pupillary light response, lateral and vertical gaze normal.  No nystagmus.  Visual fields are full to confrontation.   CN5: Intact to touch   CN7: No facial weakness, smile, facial symmetry intact.   CN8: Intact to spoken voice.   CN9&10: Gag reflex, uvula midline, palate rises with phonation.   CN11: Shoulder shrug 5/5 intact bilaterally.   CN12: Tongue midline and moves freely from side to side.     Motor: No pronator drift of upper extremity.    Normal bulk and tone all muscle groups of upper and lower extremities.       Delt Bi Tri Hand Flex/  Ext Iliopsoas Quadriceps Tibialis Anterior EHL Gastroc     C5 C6 C7 C8/T1 L2 L3 L4 L5 S1   R 5 5 5 5 5 5 5 5 5   L 5 5 5 5 5 5 5 5 5     Sensory: Sensation intact bilaterally to light touch.     Coordination; finger to nose,  rapid alternating movements smooth and rhythmic.   Romberg intact.   Heel/toe/tandem gait intact.    Normal gait and station.     Reflexes;                       Right              Left  Brachioradialis (C5,6)      2+                 2+  Biceps   (C5,6)                 2+                 2+  Triceps  (C7,8)                 2+                2+  Knee (L3,4)                      2+                2+  Ankle jerk (S1,2)              1+                1+      No hoffmans/babinski/ clonus.    Local examination: There is approximately 4 cm hard swelling with well-defined margins with no signs of inflammation.  Fluctuation negative    IMAGING:  I personally reviewed all radiographic images and agree with the radiology report of left parietal bony lesion measuring 3.9 x 4 x 1.9 cm likely osteochondroma involving the left parietal bone.    6/14/2024 CT head without contrast:  IMPRESSION:  1.  Superficial osteochondroma arising from the lateral aspect of the left parietal bone. No associated soft tissue component.  2.  No acute intracranial pathology.           Cc:   Erica Umaña

## 2024-07-24 ENCOUNTER — HOSPITAL ENCOUNTER (OUTPATIENT)
Dept: MRI IMAGING | Facility: CLINIC | Age: 29
Discharge: HOME | End: 2024-07-24
Attending: NEUROLOGICAL SURGERY | Admitting: NEUROLOGICAL SURGERY
Payer: COMMERCIAL

## 2024-07-24 DIAGNOSIS — D16.9 OSTEOCHONDROMA: ICD-10-CM

## 2024-07-24 PROCEDURE — 255N000002 HC RX 255 OP 636: Performed by: NEUROLOGICAL SURGERY

## 2024-07-24 PROCEDURE — A9585 GADOBUTROL INJECTION: HCPCS | Performed by: NEUROLOGICAL SURGERY

## 2024-07-24 PROCEDURE — 70553 MRI BRAIN STEM W/O & W/DYE: CPT

## 2024-07-24 RX ORDER — GADOBUTROL 604.72 MG/ML
5 INJECTION INTRAVENOUS ONCE
Status: COMPLETED | OUTPATIENT
Start: 2024-07-24 | End: 2024-07-24

## 2024-07-24 RX ADMIN — GADOBUTROL 5 ML: 604.72 INJECTION INTRAVENOUS at 16:03

## 2025-03-10 ENCOUNTER — TELEPHONE (OUTPATIENT)
Dept: FAMILY MEDICINE | Facility: CLINIC | Age: 30
End: 2025-03-10
Payer: COMMERCIAL

## 2025-03-10 NOTE — TELEPHONE ENCOUNTER
There was a mix up in her [facility] account, so [facility] is requesting to know her mother's maiden name to differentiate between the two. I called with [place]  April ID #[id number], no answer so we left a VM for call back. If the patient calls back, please get that information for me. Thanks.

## 2025-03-12 NOTE — TELEPHONE ENCOUNTER
Spoke to patient with [place], [place] [id number]. Patient stated her mom's maiden name is Pascual. I will enter it into her [employer] account.